# Patient Record
Sex: MALE | Race: WHITE | NOT HISPANIC OR LATINO | Employment: OTHER | URBAN - METROPOLITAN AREA
[De-identification: names, ages, dates, MRNs, and addresses within clinical notes are randomized per-mention and may not be internally consistent; named-entity substitution may affect disease eponyms.]

---

## 2017-01-27 ENCOUNTER — HISTORICAL (OUTPATIENT)
Dept: ADMINISTRATIVE | Facility: HOSPITAL | Age: 67
End: 2017-01-27

## 2018-02-15 ENCOUNTER — HISTORICAL (OUTPATIENT)
Dept: ADMINISTRATIVE | Facility: HOSPITAL | Age: 68
End: 2018-02-15

## 2018-02-15 LAB
ALBUMIN SERPL-MCNC: 3.7 GM/DL (ref 3.4–5)
ALBUMIN/GLOB SERPL: 1.4 {RATIO}
ALP SERPL-CCNC: 85 UNIT/L (ref 50–136)
ALT SERPL-CCNC: 43 UNIT/L (ref 12–78)
AST SERPL-CCNC: 21 UNIT/L (ref 15–37)
BILIRUB SERPL-MCNC: 1.2 MG/DL (ref 0.2–1)
BILIRUBIN DIRECT+TOT PNL SERPL-MCNC: 0.3 MG/DL (ref 0–0.5)
BILIRUBIN DIRECT+TOT PNL SERPL-MCNC: 0.9 MG/DL (ref 0–0.8)
BUN SERPL-MCNC: 19 MG/DL (ref 7–18)
CALCIUM SERPL-MCNC: 8.6 MG/DL (ref 8.5–10.1)
CHLORIDE SERPL-SCNC: 107 MMOL/L (ref 98–107)
CHOLEST SERPL-MCNC: 114 MG/DL (ref 0–200)
CHOLEST/HDLC SERPL: 2.4 {RATIO} (ref 0–5)
CO2 SERPL-SCNC: 25 MMOL/L (ref 21–32)
CREAT SERPL-MCNC: 0.83 MG/DL (ref 0.7–1.3)
GLOBULIN SER-MCNC: 2.6 GM/DL (ref 2.4–3.5)
GLUCOSE SERPL-MCNC: 103 MG/DL (ref 74–106)
HDLC SERPL-MCNC: 47 MG/DL (ref 35–60)
LDLC SERPL CALC-MCNC: 36 MG/DL (ref 0–129)
POTASSIUM SERPL-SCNC: 3.9 MMOL/L (ref 3.5–5.1)
PROT SERPL-MCNC: 6.3 GM/DL (ref 6.4–8.2)
SODIUM SERPL-SCNC: 140 MMOL/L (ref 136–145)
TRIGL SERPL-MCNC: 155 MG/DL (ref 30–150)
VLDLC SERPL CALC-MCNC: 31 MG/DL

## 2018-04-02 ENCOUNTER — HISTORICAL (OUTPATIENT)
Dept: LAB | Facility: HOSPITAL | Age: 68
End: 2018-04-02

## 2018-04-03 ENCOUNTER — HISTORICAL (OUTPATIENT)
Dept: CARDIOLOGY | Facility: HOSPITAL | Age: 68
End: 2018-04-03

## 2018-04-11 ENCOUNTER — HISTORICAL (OUTPATIENT)
Dept: RADIOLOGY | Facility: HOSPITAL | Age: 68
End: 2018-04-11

## 2019-01-18 ENCOUNTER — HISTORICAL (OUTPATIENT)
Dept: CARDIOLOGY | Facility: HOSPITAL | Age: 69
End: 2019-01-18

## 2019-09-25 ENCOUNTER — HISTORICAL (OUTPATIENT)
Dept: ADMINISTRATIVE | Facility: HOSPITAL | Age: 69
End: 2019-09-25

## 2019-09-25 LAB
ALBUMIN SERPL-MCNC: 4 GM/DL (ref 3.4–5)
ALP SERPL-CCNC: 90 UNIT/L (ref 50–136)
ALT SERPL-CCNC: 51 UNIT/L (ref 12–78)
AST SERPL-CCNC: 27 UNIT/L (ref 15–37)
BILIRUB SERPL-MCNC: 1.3 MG/DL (ref 0.2–1)
BILIRUBIN DIRECT+TOT PNL SERPL-MCNC: 0.3 MG/DL (ref 0–0.5)
BILIRUBIN DIRECT+TOT PNL SERPL-MCNC: 1 MG/DL (ref 0–0.8)
CHOLEST SERPL-MCNC: 126 MG/DL (ref 0–200)
CHOLEST/HDLC SERPL: 2.6 {RATIO} (ref 0–5)
HDLC SERPL-MCNC: 49 MG/DL (ref 35–60)
LDLC SERPL CALC-MCNC: 40 MG/DL (ref 0–129)
LIVER PROFILE INTERP: ABNORMAL
PROT SERPL-MCNC: 6.7 GM/DL (ref 6.4–8.2)
TRIGL SERPL-MCNC: 185 MG/DL (ref 30–150)
VLDLC SERPL CALC-MCNC: 37 MG/DL

## 2020-09-09 ENCOUNTER — HISTORICAL (OUTPATIENT)
Dept: ADMINISTRATIVE | Facility: HOSPITAL | Age: 70
End: 2020-09-09

## 2021-01-15 ENCOUNTER — HISTORICAL (OUTPATIENT)
Dept: ADMINISTRATIVE | Facility: HOSPITAL | Age: 71
End: 2021-01-15

## 2021-01-15 LAB
ABS NEUT (OLG): 3.8 X10(3)/MCL (ref 2.1–9.2)
ALBUMIN SERPL-MCNC: 4.2 GM/DL (ref 3.4–4.8)
ALBUMIN/GLOB SERPL: 1.8 RATIO (ref 1.1–2)
ALP SERPL-CCNC: 95 UNIT/L (ref 40–150)
ALT SERPL-CCNC: 34 UNIT/L (ref 0–55)
AST SERPL-CCNC: 26 UNIT/L (ref 5–34)
BASOPHILS # BLD AUTO: 0 X10(3)/MCL (ref 0–0.2)
BASOPHILS NFR BLD AUTO: 1 %
BILIRUB SERPL-MCNC: 1.2 MG/DL
BILIRUBIN DIRECT+TOT PNL SERPL-MCNC: 0.5 MG/DL (ref 0–0.5)
BILIRUBIN DIRECT+TOT PNL SERPL-MCNC: 0.7 MG/DL (ref 0–0.8)
BUN SERPL-MCNC: 13.8 MG/DL (ref 8.4–25.7)
CALCIUM SERPL-MCNC: 8.8 MG/DL (ref 8.8–10)
CHLORIDE SERPL-SCNC: 111 MMOL/L (ref 98–107)
CHOLEST SERPL-MCNC: 128 MG/DL
CHOLEST/HDLC SERPL: 3 {RATIO} (ref 0–5)
CO2 SERPL-SCNC: 24 MMOL/L (ref 23–31)
CREAT SERPL-MCNC: 0.82 MG/DL (ref 0.73–1.18)
EOSINOPHIL # BLD AUTO: 0.2 X10(3)/MCL (ref 0–0.9)
EOSINOPHIL NFR BLD AUTO: 4 %
ERYTHROCYTE [DISTWIDTH] IN BLOOD BY AUTOMATED COUNT: 12.3 % (ref 11.5–17)
GLOBULIN SER-MCNC: 2.4 GM/DL (ref 2.4–3.5)
GLUCOSE SERPL-MCNC: 94 MG/DL (ref 82–115)
HCT VFR BLD AUTO: 43.3 % (ref 42–52)
HDLC SERPL-MCNC: 47 MG/DL (ref 35–60)
HGB BLD-MCNC: 15.1 GM/DL (ref 14–18)
LDLC SERPL CALC-MCNC: 59 MG/DL (ref 50–140)
LYMPHOCYTES # BLD AUTO: 1.8 X10(3)/MCL (ref 0.6–4.6)
LYMPHOCYTES NFR BLD AUTO: 29 %
MCH RBC QN AUTO: 35.6 PG (ref 27–31)
MCHC RBC AUTO-ENTMCNC: 34.9 GM/DL (ref 33–36)
MCV RBC AUTO: 102.1 FL (ref 80–94)
MONOCYTES # BLD AUTO: 0.4 X10(3)/MCL (ref 0.1–1.3)
MONOCYTES NFR BLD AUTO: 6 %
NEUTROPHILS # BLD AUTO: 3.8 X10(3)/MCL (ref 2.1–9.2)
NEUTROPHILS NFR BLD AUTO: 60 %
PLATELET # BLD AUTO: 154 X10(3)/MCL (ref 130–400)
PMV BLD AUTO: 10.6 FL (ref 9.4–12.4)
POTASSIUM SERPL-SCNC: 4.4 MMOL/L (ref 3.5–5.1)
PROT SERPL-MCNC: 6.6 GM/DL (ref 5.8–7.6)
RBC # BLD AUTO: 4.24 X10(6)/MCL (ref 4.7–6.1)
SODIUM SERPL-SCNC: 142 MMOL/L (ref 136–145)
TRIGL SERPL-MCNC: 110 MG/DL (ref 34–140)
TSH SERPL-ACNC: 1.53 UIU/ML (ref 0.35–4.94)
URATE SERPL-MCNC: 7.2 MG/DL (ref 3.5–7.2)
VLDLC SERPL CALC-MCNC: 22 MG/DL
WBC # SPEC AUTO: 6.4 X10(3)/MCL (ref 4.5–11.5)

## 2021-09-06 ENCOUNTER — HISTORICAL (OUTPATIENT)
Dept: ADMINISTRATIVE | Facility: HOSPITAL | Age: 71
End: 2021-09-06

## 2021-09-06 LAB
SARS-COV-2 RNA RESP QL NAA+PROBE: NEGATIVE
SARS-COV-2 RNA RESP QL NAA+PROBE: NOT DETECTED

## 2021-11-22 ENCOUNTER — HISTORICAL (OUTPATIENT)
Dept: ADMINISTRATIVE | Facility: HOSPITAL | Age: 71
End: 2021-11-22

## 2021-11-22 LAB — SARS-COV-2 RNA RESP QL NAA+PROBE: NEGATIVE

## 2022-03-17 ENCOUNTER — HOSPITAL ENCOUNTER (OUTPATIENT)
Dept: MEDSURG UNIT | Facility: HOSPITAL | Age: 72
End: 2022-03-18
Attending: INTERNAL MEDICINE | Admitting: INTERNAL MEDICINE

## 2022-03-18 LAB
ABS NEUT (OLG): 3.53 (ref 2.1–9.2)
ALBUMIN SERPL-MCNC: 3.6 G/DL (ref 3.4–4.8)
ALBUMIN/GLOB SERPL: 1.6 {RATIO} (ref 1.1–2)
ALP SERPL-CCNC: 79 U/L (ref 40–150)
ALT SERPL-CCNC: 26 U/L (ref 0–55)
AST SERPL-CCNC: 24 U/L (ref 5–34)
BASOPHILS # BLD AUTO: 0 10*3/UL (ref 0–0.2)
BASOPHILS NFR BLD AUTO: 0 %
BILIRUB SERPL-MCNC: 1.2 MG/DL
BILIRUBIN DIRECT+TOT PNL SERPL-MCNC: 0.5 (ref 0–0.5)
BILIRUBIN DIRECT+TOT PNL SERPL-MCNC: 0.7 (ref 0–0.8)
BUN SERPL-MCNC: 15.5 MG/DL (ref 8.4–25.7)
CALCIUM SERPL-MCNC: 8.5 MG/DL (ref 8.7–10.5)
CHLORIDE SERPL-SCNC: 109 MMOL/L (ref 98–107)
CO2 SERPL-SCNC: 23 MMOL/L (ref 23–31)
CREAT SERPL-MCNC: 0.87 MG/DL (ref 0.73–1.18)
EOSINOPHIL # BLD AUTO: 0.2 10*3/UL (ref 0–0.9)
EOSINOPHIL NFR BLD AUTO: 3 %
ERYTHROCYTE [DISTWIDTH] IN BLOOD BY AUTOMATED COUNT: 13 % (ref 11.5–17)
GLOBULIN SER-MCNC: 2.3 G/DL (ref 2.4–3.5)
GLUCOSE SERPL-MCNC: 117 MG/DL (ref 82–115)
HCT VFR BLD AUTO: 39.8 % (ref 42–52)
HEMOLYSIS INTERF INDEX SERPL-ACNC: 37
HGB BLD-MCNC: 13.5 G/DL (ref 14–18)
ICTERIC INTERF INDEX SERPL-ACNC: 1
LIPEMIC INTERF INDEX SERPL-ACNC: 3
LYMPHOCYTES # BLD AUTO: 1.4 10*3/UL (ref 0.6–4.6)
LYMPHOCYTES NFR BLD AUTO: 24 %
MANUAL DIFF? (OHS): NO
MCH RBC QN AUTO: 36.8 PG (ref 27–31)
MCHC RBC AUTO-ENTMCNC: 33.9 G/DL (ref 33–36)
MCV RBC AUTO: 108.4 FL (ref 80–94)
MONOCYTES # BLD AUTO: 0.5 10*3/UL (ref 0.1–1.3)
MONOCYTES NFR BLD AUTO: 9 %
NEUTROPHILS # BLD AUTO: 3.53 10*3/UL (ref 2.1–9.2)
NEUTROPHILS NFR BLD AUTO: 62 %
PLATELET # BLD AUTO: 96 10*3/UL (ref 130–400)
PMV BLD AUTO: 10 FL (ref 9.4–12.4)
POS ERR1 (OHS): NORMAL
POTASSIUM SERPL-SCNC: 4.3 MMOL/L (ref 3.5–5.1)
PROT SERPL-MCNC: 5.9 G/DL (ref 5.8–7.6)
RBC # BLD AUTO: 3.67 10*6/UL (ref 4.7–6.1)
SODIUM SERPL-SCNC: 141 MMOL/L (ref 136–145)
WBC # SPEC AUTO: 5.6 10*3/UL (ref 4.5–11.5)

## 2022-04-09 ENCOUNTER — HISTORICAL (OUTPATIENT)
Dept: ADMINISTRATIVE | Facility: HOSPITAL | Age: 72
End: 2022-04-09
Payer: MEDICARE

## 2022-04-27 VITALS
DIASTOLIC BLOOD PRESSURE: 87 MMHG | WEIGHT: 285.06 LBS | HEIGHT: 70 IN | BODY MASS INDEX: 40.81 KG/M2 | SYSTOLIC BLOOD PRESSURE: 145 MMHG | OXYGEN SATURATION: 97 %

## 2022-05-26 ENCOUNTER — OFFICE VISIT (OUTPATIENT)
Dept: NEUROLOGY | Facility: CLINIC | Age: 72
End: 2022-05-26
Payer: MEDICARE

## 2022-05-26 VITALS
DIASTOLIC BLOOD PRESSURE: 78 MMHG | SYSTOLIC BLOOD PRESSURE: 114 MMHG | WEIGHT: 270 LBS | HEIGHT: 70 IN | BODY MASS INDEX: 38.65 KG/M2

## 2022-05-26 DIAGNOSIS — G47.33 OSA ON CPAP: Primary | ICD-10-CM

## 2022-05-26 PROCEDURE — 99999 PR PBB SHADOW E&M-EST. PATIENT-LVL III: CPT | Mod: PBBFAC,,, | Performed by: NURSE PRACTITIONER

## 2022-05-26 PROCEDURE — 99213 OFFICE O/P EST LOW 20 MIN: CPT | Mod: PBBFAC | Performed by: NURSE PRACTITIONER

## 2022-05-26 PROCEDURE — 99213 OFFICE O/P EST LOW 20 MIN: CPT | Mod: S$PBB,,, | Performed by: NURSE PRACTITIONER

## 2022-05-26 PROCEDURE — 99213 PR OFFICE/OUTPT VISIT, EST, LEVL III, 20-29 MIN: ICD-10-PCS | Mod: S$PBB,,, | Performed by: NURSE PRACTITIONER

## 2022-05-26 PROCEDURE — 99999 PR PBB SHADOW E&M-EST. PATIENT-LVL III: ICD-10-PCS | Mod: PBBFAC,,, | Performed by: NURSE PRACTITIONER

## 2022-05-26 RX ORDER — RANOLAZINE 1000 MG/1
1000 TABLET, EXTENDED RELEASE ORAL 2 TIMES DAILY
COMMUNITY
Start: 2022-05-09

## 2022-05-26 RX ORDER — VALSARTAN 80 MG/1
80 TABLET ORAL NIGHTLY
COMMUNITY
Start: 2022-04-24

## 2022-05-26 RX ORDER — ICOSAPENT ETHYL 1000 MG/1
2 CAPSULE ORAL 2 TIMES DAILY
COMMUNITY
Start: 2022-05-05

## 2022-05-26 RX ORDER — ATORVASTATIN CALCIUM 40 MG/1
40 TABLET, FILM COATED ORAL NIGHTLY
COMMUNITY
Start: 2022-01-12

## 2022-05-26 RX ORDER — DILTIAZEM HYDROCHLORIDE 120 MG/1
120 CAPSULE, COATED, EXTENDED RELEASE ORAL DAILY
COMMUNITY
Start: 2022-03-25

## 2022-05-26 RX ORDER — METOPROLOL SUCCINATE 100 MG/1
100 TABLET, EXTENDED RELEASE ORAL EVERY MORNING
COMMUNITY
Start: 2022-04-24

## 2022-05-26 RX ORDER — CLOPIDOGREL BISULFATE 75 MG/1
75 TABLET ORAL DAILY
COMMUNITY
Start: 2022-03-18 | End: 2022-10-31 | Stop reason: ALTCHOICE

## 2022-05-26 RX ORDER — NITROGLYCERIN 0.4 MG/1
1 TABLET SUBLINGUAL EVERY 5 MIN PRN
COMMUNITY
Start: 2022-02-07 | End: 2023-09-18

## 2022-05-26 NOTE — PROGRESS NOTES
Subjective:       Patient ID: Kevin Alva III is a 71 y.o. male.    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    Cardiac stent placed in march    Review of Systems    A 14pt ROS was reviewed & is negative unless otherwise documented in the HPI    Objective:      Physical Exam    GENERAL: NAD, calm, cooperative, appropriate  Awake/alert  Well groomed  RESP: CTAB  HEART: RRR  No LE edema  MENTAL STATUS: oriented, follow commands reliably  SPEECH/LANGUAGE: clear, fluent  CN:  Perrla, eomi, vff, gaze conjugate  No tactile or motor facial asymmetry  Tongue protrudes midline  Motor: no focal weakness  Cerebellar: no tremor or dysmetria  Sensory: normal to tactile stim/vibration  DTRs: normal +2, symmetric  Gait: steady    I, shannan colón np, certify that dr. Doug frazier the supervising/rendering physician is physically present in the office at the time of the visit    Assessment:       Problem List Items Addressed This Visit        Other    MILE on CPAP - Primary (Chronic)          Plan:       Increase cpap to 18  Supplies renewed  Compliant/benefitting/medically necessary  F/u 1y

## 2022-07-20 ENCOUNTER — OFFICE VISIT (OUTPATIENT)
Dept: URGENT CARE | Facility: CLINIC | Age: 72
End: 2022-07-20
Payer: MEDICARE

## 2022-07-20 VITALS
SYSTOLIC BLOOD PRESSURE: 130 MMHG | OXYGEN SATURATION: 97 % | RESPIRATION RATE: 16 BRPM | BODY MASS INDEX: 40.82 KG/M2 | HEART RATE: 65 BPM | HEIGHT: 70 IN | TEMPERATURE: 98 F | DIASTOLIC BLOOD PRESSURE: 83 MMHG | WEIGHT: 285.13 LBS

## 2022-07-20 DIAGNOSIS — Z11.52 ENCOUNTER FOR SCREENING FOR COVID-19: Primary | ICD-10-CM

## 2022-07-20 DIAGNOSIS — J06.9 ACUTE URI: ICD-10-CM

## 2022-07-20 LAB
CTP QC/QA: YES
FLUAV AG UPPER RESP QL IA.RAPID: NOT DETECTED
FLUBV AG UPPER RESP QL IA.RAPID: NOT DETECTED
RSV A 5' UTR RNA NPH QL NAA+PROBE: NOT DETECTED
SARS-COV-2 RDRP RESP QL NAA+PROBE: NEGATIVE
SARS-COV-2 RNA RESP QL NAA+PROBE: NOT DETECTED

## 2022-07-20 PROCEDURE — U0002 COVID-19 LAB TEST NON-CDC: HCPCS | Mod: PBBFAC | Performed by: NURSE PRACTITIONER

## 2022-07-20 PROCEDURE — 99213 PR OFFICE/OUTPT VISIT, EST, LEVL III, 20-29 MIN: ICD-10-PCS | Mod: S$PBB,,, | Performed by: NURSE PRACTITIONER

## 2022-07-20 PROCEDURE — 99215 OFFICE O/P EST HI 40 MIN: CPT | Mod: PBBFAC | Performed by: NURSE PRACTITIONER

## 2022-07-20 PROCEDURE — 87636 SARSCOV2 & INF A&B AMP PRB: CPT | Performed by: NURSE PRACTITIONER

## 2022-07-20 PROCEDURE — 99213 OFFICE O/P EST LOW 20 MIN: CPT | Mod: S$PBB,,, | Performed by: NURSE PRACTITIONER

## 2022-07-20 NOTE — PATIENT INSTRUCTIONS
- OTC cold/flu products as desired for symptoms  - Plenty of fluids  - Home from work/school  - Tylenol or Motrin for pain/fever  - Flu/COVID/RSV tests pending in hospital lab.    - There is a chance your test in the clinic was a FALSE negative due to your symptoms starting today. We will know your final result today and notify you. Please answer your phone when you see [phone] calling you.

## 2022-07-20 NOTE — PROGRESS NOTES
"Subjective:       Patient ID: Kevin Alva III is a 71 y.o. male.    Vitals:  height is 5' 9.69" (1.77 m) and weight is 129.3 kg (285 lb 1.6 oz). His temperature is 97.7 °F (36.5 °C). His blood pressure is 130/83 and his pulse is 65. His respiration is 16 and oxygen saturation is 97%.     Chief Complaint: Headache (HA, sneezing, congestion today. Denies sore throat/swab.)    HPI as stated in CC. No prior hx of COVID. +vaccinated. States mostly congestion/sneezing.  ROS    Objective:      Physical Exam   Constitutional: He is oriented to person, place, and time.  Non-toxic appearance. He appears ill. No distress. obesity  HENT:   Nose: Congestion present.   Eyes: Right conjunctiva is injected. Left conjunctiva is injected.   Neck: Neck supple.   Cardiovascular: Normal rate, regular rhythm and normal heart sounds.   Pulmonary/Chest: Effort normal and breath sounds normal.   Neurological: He is alert and oriented to person, place, and time.   Skin: Skin is warm, dry and not diaphoretic.   Psychiatric: His behavior is normal. Mood, judgment and thought content normal.   Vitals reviewed.        Assessment:       1. Encounter for screening for COVID-19    2. Acute URI        Results for orders placed or performed in visit on 07/20/22   POCT COVID-19 Rapid Screening   Result Value Ref Range    POC Rapid COVID Negative Negative     Acceptable Yes      Plan:         Encounter for screening for COVID-19  -     POCT COVID-19 Rapid Screening  -     COVID/RSV/FLU A&B PCR    Acute URI  -     POCT COVID-19 Rapid Screening  -     COVID/RSV/FLU A&B PCR    Repeat PCR Combo swab due to symptom onset yesterday or today, possible false negative.  Pt has +hx cardiac stents, BMI 41, sleep apnea, age is 71 years old. If his PCR test is COVID+ I will refer him for mAb infusion, he verbally consents to this at this time. He does not qualify for Paxlovid because he takes both Eliquis and Plavix.      - OTC cold/flu products " as desired for symptoms  - Plenty of fluids  - Home from work/school  - Tylenol or Motrin for pain/fever  - Flu/COVID/RSV tests pending in hospital lab.    - There is a chance your test in the clinic was a FALSE negative due to your symptoms starting today. We will know your final result today and notify you. Please answer your phone when you see [phone] calling you.

## 2022-07-20 NOTE — LETTER
July 20, 2022      [facility]  [address] [facility] [address]  Phone: [phone]       Patient: Kevin Alva   YOB: 1950  Date of Visit: 07/20/2022    To Whom It May Concern:    Roxy Alva  was at [facility] on 07/20/2022. The patient may return to work/school when results are received. If you have any questions or concerns, or if I can be of further assistance, please do not hesitate to contact me.    Sincerely,    ADDISON Boudreaux NP

## 2022-08-05 ENCOUNTER — OFFICE VISIT (OUTPATIENT)
Dept: INTERNAL MEDICINE | Facility: CLINIC | Age: 72
End: 2022-08-05
Payer: MEDICARE

## 2022-08-05 VITALS
DIASTOLIC BLOOD PRESSURE: 80 MMHG | RESPIRATION RATE: 16 BRPM | OXYGEN SATURATION: 95 % | WEIGHT: 286 LBS | HEART RATE: 62 BPM | HEIGHT: 70 IN | SYSTOLIC BLOOD PRESSURE: 128 MMHG | BODY MASS INDEX: 40.94 KG/M2

## 2022-08-05 DIAGNOSIS — R19.03 RIGHT LOWER QUADRANT ABDOMINAL MASS: ICD-10-CM

## 2022-08-05 DIAGNOSIS — R53.81 MALAISE AND FATIGUE: ICD-10-CM

## 2022-08-05 DIAGNOSIS — Z23 NEED FOR PNEUMOCOCCAL VACCINE: Primary | ICD-10-CM

## 2022-08-05 DIAGNOSIS — R53.83 MALAISE AND FATIGUE: ICD-10-CM

## 2022-08-05 PROBLEM — M79.606 PAIN OF LOWER EXTREMITY: Status: ACTIVE | Noted: 2022-08-05

## 2022-08-05 PROBLEM — I10 HYPERTENSION: Status: ACTIVE | Noted: 2022-08-05

## 2022-08-05 PROBLEM — E78.5 HYPERLIPIDEMIA: Status: ACTIVE | Noted: 2022-08-05

## 2022-08-05 PROBLEM — I65.29 STENOSIS OF CAROTID ARTERY: Status: ACTIVE | Noted: 2022-08-05

## 2022-08-05 PROBLEM — R19.7 DIARRHEA: Status: ACTIVE | Noted: 2020-11-24

## 2022-08-05 PROBLEM — R42 VERTIGO: Status: ACTIVE | Noted: 2022-08-05

## 2022-08-05 PROBLEM — I25.10 ATHEROSCLEROSIS OF CORONARY ARTERY: Status: ACTIVE | Noted: 2022-08-05

## 2022-08-05 PROBLEM — E66.9 OBESITY: Status: ACTIVE | Noted: 2022-08-05

## 2022-08-05 PROBLEM — Z72.0 TOBACCO USER: Status: ACTIVE | Noted: 2022-08-05

## 2022-08-05 PROBLEM — I34.0 MITRAL VALVE INSUFFICIENCY: Status: ACTIVE | Noted: 2022-08-05

## 2022-08-05 PROBLEM — I20.9 ANGINA PECTORIS: Status: ACTIVE | Noted: 2022-08-05

## 2022-08-05 PROBLEM — M10.00 IDIOPATHIC GOUT: Status: ACTIVE | Noted: 2018-12-17

## 2022-08-05 PROBLEM — I48.91 ATRIAL FIBRILLATION: Status: ACTIVE | Noted: 2022-08-05

## 2022-08-05 PROBLEM — Z20.828 CONTACT WITH AND (SUSPECTED) EXPOSURE TO OTHER VIRAL COMMUNICABLE DISEASES: Status: ACTIVE | Noted: 2020-10-09

## 2022-08-05 PROBLEM — R06.81 APNEA: Status: ACTIVE | Noted: 2022-08-05

## 2022-08-05 PROBLEM — M79.10 MYALGIA: Status: ACTIVE | Noted: 2022-08-05

## 2022-08-05 PROCEDURE — G0009 ADMIN PNEUMOCOCCAL VACCINE: HCPCS | Mod: ,,, | Performed by: NURSE PRACTITIONER

## 2022-08-05 PROCEDURE — 90677 PNEUMOCOCCAL CONJUGATE VACCINE 20-VALENT: ICD-10-PCS | Mod: ,,, | Performed by: NURSE PRACTITIONER

## 2022-08-05 PROCEDURE — 90677 PCV20 VACCINE IM: CPT | Mod: ,,, | Performed by: NURSE PRACTITIONER

## 2022-08-05 PROCEDURE — 99214 OFFICE O/P EST MOD 30 MIN: CPT | Mod: 25,,, | Performed by: NURSE PRACTITIONER

## 2022-08-05 PROCEDURE — G0009 PNEUMOCOCCAL CONJUGATE VACCINE 20-VALENT: ICD-10-PCS | Mod: ,,, | Performed by: NURSE PRACTITIONER

## 2022-08-05 PROCEDURE — 99214 PR OFFICE/OUTPT VISIT, EST, LEVL IV, 30-39 MIN: ICD-10-PCS | Mod: 25,,, | Performed by: NURSE PRACTITIONER

## 2022-08-05 RX ORDER — FUROSEMIDE 40 MG/1
40 TABLET ORAL DAILY PRN
COMMUNITY
Start: 2022-07-20

## 2022-08-05 NOTE — PROGRESS NOTES
"Subjective:      Patient ID: Kevin Alva III is a 71 y.o. male.    Chief Complaint: Mass (Pt has a hard knot on lower right of naval and not painful pt states it is the size of an egg. Pt noticed it yesterday)      HPI: Patient here today for c/o L lateral lump to umbilicus. States area is a hard knot to R latera aspect of umbilicus noted last night.  Denies pain, fever, chills, sweats, excessive weight loss.     Vaccines: PNA today.      Review of patient's allergies indicates:   Allergen Reactions    Promethazine Hives       Review of Systems  Constitutional: No fever, No chills, No sweats, Chronic, general fatigue, No weight loss.  Gastrointestinal: No nausea, No vomiting, No diarrhea, No rectal bleeding, No constipation, No abdominal pain. R lateral umbilical lump.    Objective:   /80 (BP Location: Left arm, Patient Position: Sitting)   Pulse 62   Resp 16   Ht 5' 9.69" (1.77 m)   Wt 129.7 kg (286 lb)   SpO2 95%   BMI 41.40 kg/m²     Physical Exam  Constitutional:       General: He is not in acute distress.     Appearance: Normal appearance. He is normal weight. He is not ill-appearing, toxic-appearing or diaphoretic.   HENT:      Head: Normocephalic and atraumatic.   Eyes:      Pupils: Pupils are equal, round, and reactive to light.   Cardiovascular:      Rate and Rhythm: Normal rate and regular rhythm.      Heart sounds: Normal heart sounds. No murmur heard.    No friction rub. No gallop.   Pulmonary:      Effort: Pulmonary effort is normal. No respiratory distress.      Breath sounds: Normal breath sounds. No stridor. No wheezing, rhonchi or rales.   Chest:      Chest wall: No tenderness.   Abdominal:      General: Abdomen is flat. Bowel sounds are normal. There is no distension.      Palpations: Abdomen is soft. There is mass.      Tenderness: There is no abdominal tenderness. There is no right CVA tenderness, left CVA tenderness, guarding or rebound.      Hernia: A hernia (R lateral " abdominal wall hernia approx 4 cm in diameter, NTTP, reducible) is present.   Musculoskeletal:         General: No swelling, tenderness, deformity or signs of injury. Normal range of motion.      Right lower leg: No edema.      Left lower leg: No edema.   Skin:     General: Skin is warm and dry.      Coloration: Skin is not jaundiced or pale.      Findings: No bruising, erythema, lesion or rash.   Neurological:      General: No focal deficit present.      Mental Status: He is alert and oriented to person, place, and time. Mental status is at baseline.      Cranial Nerves: No cranial nerve deficit.      Sensory: No sensory deficit.      Motor: No weakness.      Coordination: Coordination normal.      Gait: Gait normal.      Deep Tendon Reflexes: Reflexes normal.   Psychiatric:         Mood and Affect: Mood normal.         Thought Content: Thought content normal.         Judgment: Judgment normal.         Assessment/Plan:   1. Right lower quadrant abdominal mass  US with Valsalva. Consider further eval with CT abdomen if warranted.  Reassurance provided.  - US Abdomen Limited_Hernia; Future    2. Malaise and fatigue  Rec gradual inc in CV activity to 150 minutes/week. TLCs.      No follow-ups on file.

## 2022-08-09 DIAGNOSIS — R19.00 INTRA-ABDOMINAL AND PELVIC SWELLING, MASS AND LUMP, UNSPECIFIED SITE: ICD-10-CM

## 2022-08-10 DIAGNOSIS — N28.1 RENAL CYST, LEFT: Primary | ICD-10-CM

## 2022-08-10 DIAGNOSIS — M45.6 ANKYLOSING SPONDYLITIS OF LUMBAR REGION: Primary | ICD-10-CM

## 2022-08-18 ENCOUNTER — LAB VISIT (OUTPATIENT)
Dept: LAB | Facility: HOSPITAL | Age: 72
End: 2022-08-18
Attending: NURSE PRACTITIONER
Payer: MEDICARE

## 2022-08-18 DIAGNOSIS — M45.6 ANKYLOSING SPONDYLITIS OF LUMBAR REGION: ICD-10-CM

## 2022-08-18 LAB
CRP SERPL-MCNC: 1.6 MG/L
ERYTHROCYTE [SEDIMENTATION RATE] IN BLOOD: 12 MM/HR (ref 0–15)

## 2022-08-18 PROCEDURE — 85651 RBC SED RATE NONAUTOMATED: CPT

## 2022-08-18 PROCEDURE — 86431 RHEUMATOID FACTOR QUANT: CPT

## 2022-08-18 PROCEDURE — 36415 COLL VENOUS BLD VENIPUNCTURE: CPT

## 2022-08-18 PROCEDURE — 86140 C-REACTIVE PROTEIN: CPT

## 2022-08-21 ENCOUNTER — OFFICE VISIT (OUTPATIENT)
Dept: URGENT CARE | Facility: CLINIC | Age: 72
End: 2022-08-21
Payer: MEDICARE

## 2022-08-21 VITALS
DIASTOLIC BLOOD PRESSURE: 73 MMHG | SYSTOLIC BLOOD PRESSURE: 120 MMHG | HEART RATE: 80 BPM | TEMPERATURE: 98 F | BODY MASS INDEX: 40.54 KG/M2 | OXYGEN SATURATION: 97 % | WEIGHT: 283.19 LBS | RESPIRATION RATE: 18 BRPM | HEIGHT: 70 IN

## 2022-08-21 DIAGNOSIS — Z11.52 ENCOUNTER FOR SCREENING FOR COVID-19: Primary | ICD-10-CM

## 2022-08-21 DIAGNOSIS — R68.89 FLU-LIKE SYMPTOMS: ICD-10-CM

## 2022-08-21 LAB
CTP QC/QA: YES
CTP QC/QA: YES
FLUAV AG NPH QL: NEGATIVE
FLUAV AG UPPER RESP QL IA.RAPID: NOT DETECTED
FLUBV AG NPH QL: NEGATIVE
FLUBV AG UPPER RESP QL IA.RAPID: NOT DETECTED
HLA TYP COMM-IMP: NORMAL
HLA-B27 QL FC: NEGATIVE
RSV A 5' UTR RNA NPH QL NAA+PROBE: NOT DETECTED
SARS-COV-2 RDRP RESP QL NAA+PROBE: NEGATIVE
SARS-COV-2 RNA RESP QL NAA+PROBE: NOT DETECTED

## 2022-08-21 PROCEDURE — 99214 OFFICE O/P EST MOD 30 MIN: CPT | Mod: PBBFAC | Performed by: NURSE PRACTITIONER

## 2022-08-21 PROCEDURE — 99213 OFFICE O/P EST LOW 20 MIN: CPT | Mod: S$PBB,,, | Performed by: NURSE PRACTITIONER

## 2022-08-21 PROCEDURE — 87804 INFLUENZA ASSAY W/OPTIC: CPT | Mod: PBBFAC | Performed by: NURSE PRACTITIONER

## 2022-08-21 PROCEDURE — 87636 SARSCOV2 & INF A&B AMP PRB: CPT | Mod: CR | Performed by: NURSE PRACTITIONER

## 2022-08-21 PROCEDURE — U0002 COVID-19 LAB TEST NON-CDC: HCPCS | Mod: PBBFAC | Performed by: NURSE PRACTITIONER

## 2022-08-21 PROCEDURE — 99213 PR OFFICE/OUTPT VISIT, EST, LEVL III, 20-29 MIN: ICD-10-PCS | Mod: S$PBB,,, | Performed by: NURSE PRACTITIONER

## 2022-08-21 NOTE — PROGRESS NOTES
"Subjective:       Patient ID: Kevin Alva III is a 71 y.o. male.    Vitals:  height is 5' 9.69" (1.77 m) and weight is 128.5 kg (283 lb 3.2 oz). His oral temperature is 98.3 °F (36.8 °C). His blood pressure is 120/73 and his pulse is 80. His respiration is 18 and oxygen saturation is 97%.     Chief Complaint: Nasal Congestion, Fatigue, and Headache    HPI NO prior history of COVID. Symptoms as above. Confirms takes Eliquis.  ROS    Objective:      Physical Exam   Constitutional: He is oriented to person, place, and time.  Non-toxic appearance. He appears ill. No distress. obesity  HENT:   Nose: Congestion present. No rhinorrhea.   Cardiovascular: Normal rate, regular rhythm and normal heart sounds.      Comments: Distant heart sounds   Pulmonary/Chest: Effort normal. No respiratory distress. He has no wheezes. He has no rhonchi. He has no rales.   Neurological: He is alert and oriented to person, place, and time.   Skin: Skin is dry.   Psychiatric: His affect is angry and inappropriate. He is agitated.   Vitals reviewed.        Assessment:       1. Encounter for screening for COVID-19    2. Flu-like symptoms        Results for orders placed or performed in visit on 08/21/22   POCT COVID-19 Rapid Screening   Result Value Ref Range    POC Rapid COVID Negative Negative     Acceptable Yes    POCT Influenza A/B   Result Value Ref Range    Rapid Influenza A Ag Negative Negative    Rapid Influenza B Ag Negative Negative     Acceptable Yes        Plan:         Encounter for screening for COVID-19  -     POCT COVID-19 Rapid Screening  -     COVID/RSV/FLU A&B PCR    Flu-like symptoms  -     POCT Influenza A/B  -     COVID/RSV/FLU A&B PCR         - OTC cold/flu products as desired for symptoms  - Plenty of fluids  - Home from work/school  - Tylenol or Motrin for pain/fever  - Flu/COVID/RSV PCR tests pending      - You are disqualified for the home treatment called Paxlovid due to your being on " Eliquis. If you are COVID+ on PCR, you qualifiy for Monoclonal Antibody Infusion. You indicated during this visit that you would accept that treatment. We will notify you if your test is + and send your referral ASAP.

## 2022-08-21 NOTE — PATIENT INSTRUCTIONS
- OTC cold/flu products as desired for symptoms  - Plenty of fluids  - Home from work/school  - Tylenol or Motrin for pain/fever  - Flu/COVID/RSV PCR tests pending      - You are disqualified for the home treatment called Paxlovid due to your being on Eliquis. If you are COVID+ on PCR, you qualifiy for Monoclonal Antibody Infusion. You indicated during this visit that you would accept that treatment. We will notify you if your test is + and send your referral ASAP.

## 2022-08-22 ENCOUNTER — PATIENT MESSAGE (OUTPATIENT)
Dept: INTERNAL MEDICINE | Facility: CLINIC | Age: 72
End: 2022-08-22
Payer: MEDICARE

## 2022-08-22 LAB — RHEUMATOID FACTOR IGM (OLG): NEGATIVE

## 2022-08-25 ENCOUNTER — OFFICE VISIT (OUTPATIENT)
Dept: INTERNAL MEDICINE | Facility: CLINIC | Age: 72
End: 2022-08-25
Payer: MEDICARE

## 2022-08-25 DIAGNOSIS — J32.9 SINUSITIS, UNSPECIFIED CHRONICITY, UNSPECIFIED LOCATION: Primary | ICD-10-CM

## 2022-08-25 DIAGNOSIS — I10 PRIMARY HYPERTENSION: ICD-10-CM

## 2022-08-25 DIAGNOSIS — E78.2 MIXED HYPERLIPIDEMIA: ICD-10-CM

## 2022-08-25 DIAGNOSIS — Z12.5 SCREENING FOR PROSTATE CANCER: ICD-10-CM

## 2022-08-25 DIAGNOSIS — Z00.00 ENCOUNTER FOR WELLNESS EXAMINATION IN ADULT: ICD-10-CM

## 2022-08-25 PROCEDURE — 99214 PR OFFICE/OUTPT VISIT, EST, LEVL IV, 30-39 MIN: ICD-10-PCS | Mod: 95,,, | Performed by: NURSE PRACTITIONER

## 2022-08-25 PROCEDURE — 99214 OFFICE O/P EST MOD 30 MIN: CPT | Mod: 95,,, | Performed by: NURSE PRACTITIONER

## 2022-08-25 RX ORDER — CEFDINIR 300 MG/1
300 CAPSULE ORAL 2 TIMES DAILY
Qty: 20 CAPSULE | Refills: 0 | Status: SHIPPED | OUTPATIENT
Start: 2022-08-25 | End: 2022-09-04

## 2022-08-25 NOTE — PROGRESS NOTES
Patient ID: [id number]     Chief Complaint: Fatigue, Facial Pain, and Nasal Congestion (Pt has been congested in nose, fatigued and has facial pain and pressure, pt denies cough, no fever, pt tested neg for covid 3 days started 7 days ago. Pt also denies sore throat.)      HPI:     This is a telemedicine note. Patient was treated using telemedicine, real time audio and video, according to [facility] protocols. I, Melissa VALDEZ, conducted the visit from the [facility]. The patient participated in the visit at a non-[facility] location selected by the patient, identified below. I am licensed in the state where the patient stated they are located. The patient stated that they understood and accepted the privacy and security risks to their information at their location. This visit is not recorded.    Patient was located at the patient's home.       Kevin Alva III is a 71 y.o. male here today for a telemedicine visit for c/o sinus congestion, sinus/facial pressure, yellow nasal d/c and fatigue. No fever, chills, or sweats. He did take COVID test at home and at [facility], both of which negative. Flu neg.         ----------------------------  Hyperlipidemia  Hypertension  MILE on CPAP     Past Surgical History:   Procedure Laterality Date    CORONARY ANGIOPLASTY WITH STENT PLACEMENT  03/2022       Review of patient's allergies indicates:   Allergen Reactions    Promethazine Hives       Outpatient Medications Marked as Taking for the 8/25/22 encounter (Office Visit) with Melissa Santiago NP   Medication Sig Dispense Refill    allopurinoL (ZYLOPRIM) 100 MG tablet TAKE 1 TABLET BY MOUTH EVERY DAY 90 tablet 3    apixaban (ELIQUIS) 5 mg Tab Take 5 mg by mouth 2 (two) times a day.      atorvastatin (LIPITOR) 40 MG tablet Take 40 mg by mouth nightly.      clopidogreL (PLAVIX) 75 mg tablet Take 75 mg by mouth once daily.      diltiaZEM (CARDIZEM CD) 120 MG Cp24 Take 120 mg by mouth once daily.      furosemide  (LASIX) 40 MG tablet Take 40 mg by mouth daily as needed.      metoprolol succinate (TOPROL-XL) 100 MG 24 hr tablet Take 100 mg by mouth every morning.      nitroGLYCERIN (NITROSTAT) 0.4 MG SL tablet Place 1 tablet under the tongue every 5 (five) minutes as needed for Chest pain.      ranolazine (RANEXA) 1,000 mg Tb12 Take 1,000 mg by mouth 2 (two) times daily.      valsartan (DIOVAN) 80 MG tablet Take 80 mg by mouth nightly.      VASCEPA 1 gram Cap Take 2 capsules by mouth 2 (two) times daily.         Social History     Socioeconomic History    Marital status:    Tobacco Use    Smoking status: Former Smoker    Smokeless tobacco: Current User     Types: Chew   Substance and Sexual Activity    Alcohol use: Yes    Drug use: Never        Family History   Problem Relation Age of Onset    Cancer Mother         Lung    Cancer Father         Lung    Heart disease Father     Cancer Sister         Pancreatic        Patient Care Team:  Bucky Whittington MD as PCP - General (Internal Medicine)  MD Linda Paredes AGACNP-BC as Nurse Practitioner (Neurology)      Subjective:       ROS    See HPI for details    Constitutional: Denies Change in appetite. Denies Chills. Denies Fever. Denies Night sweats.  Eye:   ENT: Denies Decreased hearing. Denies Sore throat. Denies Swollen glands. +sinus congestion  Respiratory: Denies Cough. Denies Shortness of breath. Denies Shortness of breath with exertion. Denies Wheezing.    All Other ROS: Negative except as stated in HPI.       Objective:     There were no vitals taken for this visit.    Physical Exam    Physical Exam: LIMITED DUE TO TELEMEDICINE RESTRICTIONS.  General: Alert and oriented, No acute distress.  Head: Normocephalic, Atraumatic.  Neck/Thyroid:  Full range of motion.  Respiratory: Non-labored respirations, Symmetrical chest wall expansion.  Psychiatric: Normal interaction, Coherent speech, Euthymic mood, Appropriate affect       Assessment:        ICD-10-CM ICD-9-CM   1. Sinusitis, unspecified chronicity, unspecified location  J32.9 473.9        Plan:     1. Sinusitis, unspecified chronicity, unspecified location  - cefdinir (OMNICEF) 300 MG capsule; Take 1 capsule (300 mg total) by mouth 2 (two) times daily. for 10 days  Dispense: 20 capsule; Refill: 0    Allegra/Claritin in AM, Benadryl or Xyzal in PM, Flonase nasal spray, Mucinex DM as needed for cough.  F/u worsening s/s of infection.    Medication List with Changes/Refills   New Medications    CEFDINIR (OMNICEF) 300 MG CAPSULE    Take 1 capsule (300 mg total) by mouth 2 (two) times daily. for 10 days       Start Date: 8/25/2022 End Date: 9/4/2022   Current Medications    ALLOPURINOL (ZYLOPRIM) 100 MG TABLET    TAKE 1 TABLET BY MOUTH EVERY DAY       Start Date: 5/17/2022 End Date: --    APIXABAN (ELIQUIS) 5 MG TAB    Take 5 mg by mouth 2 (two) times a day.       Start Date: 10/26/2021End Date: --    ATORVASTATIN (LIPITOR) 40 MG TABLET    Take 40 mg by mouth nightly.       Start Date: 1/12/2022 End Date: --    CLOPIDOGREL (PLAVIX) 75 MG TABLET    Take 75 mg by mouth once daily.       Start Date: 3/18/2022 End Date: --    DILTIAZEM (CARDIZEM CD) 120 MG CP24    Take 120 mg by mouth once daily.       Start Date: 3/25/2022 End Date: --    FUROSEMIDE (LASIX) 40 MG TABLET    Take 40 mg by mouth daily as needed.       Start Date: 7/20/2022 End Date: --    METOPROLOL SUCCINATE (TOPROL-XL) 100 MG 24 HR TABLET    Take 100 mg by mouth every morning.       Start Date: 4/24/2022 End Date: --    NITROGLYCERIN (NITROSTAT) 0.4 MG SL TABLET    Place 1 tablet under the tongue every 5 (five) minutes as needed for Chest pain.       Start Date: 2/7/2022  End Date: --    RANOLAZINE (RANEXA) 1,000 MG TB12    Take 1,000 mg by mouth 2 (two) times daily.       Start Date: 5/9/2022  End Date: --    VALSARTAN (DIOVAN) 80 MG TABLET    Take 80 mg by mouth nightly.       Start Date: 4/24/2022 End Date: --    VASCEPA 1 GRAM CAP    Take  2 capsules by mouth 2 (two) times daily.       Start Date: 5/5/2022  End Date: --          No follow-ups on file. In addition to their scheduled follow up, the patient has also been instructed to follow up on as needed basis.       Video Time Documentation:  Spent 10 minutes with patient face to face discussed health concerns. More than 50% of this time was spent in counseling and coordination of care.

## 2022-10-13 ENCOUNTER — TELEPHONE (OUTPATIENT)
Dept: INTERNAL MEDICINE | Facility: CLINIC | Age: 72
End: 2022-10-13
Payer: MEDICARE

## 2022-10-13 NOTE — TELEPHONE ENCOUNTER
"Patient called stating "I have started exercising again, walking mostly.  Since I have started again my knee problems are beginning to start up again.  I do not want to have to stop exercising but it gets worse every time I walk.  Should  I see a specialist for this?    Do you want pt to see Ortho or PT ?  Please Advise   "

## 2022-10-14 DIAGNOSIS — M25.569 KNEE PAIN, UNSPECIFIED CHRONICITY, UNSPECIFIED LATERALITY: Primary | ICD-10-CM

## 2022-10-24 DIAGNOSIS — R53.83 MALAISE AND FATIGUE: ICD-10-CM

## 2022-10-24 DIAGNOSIS — E78.2 MIXED HYPERLIPIDEMIA: ICD-10-CM

## 2022-10-24 DIAGNOSIS — E55.9 VITAMIN D DEFICIENCY: ICD-10-CM

## 2022-10-24 DIAGNOSIS — Z12.5 SCREENING FOR PROSTATE CANCER: ICD-10-CM

## 2022-10-24 DIAGNOSIS — I10 PRIMARY HYPERTENSION: Primary | ICD-10-CM

## 2022-10-24 DIAGNOSIS — R53.81 MALAISE AND FATIGUE: ICD-10-CM

## 2022-10-24 DIAGNOSIS — Z00.00 ENCOUNTER FOR WELLNESS EXAMINATION IN ADULT: ICD-10-CM

## 2022-10-24 DIAGNOSIS — R73.01 ELEVATED FASTING GLUCOSE: ICD-10-CM

## 2022-10-25 ENCOUNTER — TELEPHONE (OUTPATIENT)
Dept: INTERNAL MEDICINE | Facility: CLINIC | Age: 72
End: 2022-10-25
Payer: MEDICARE

## 2022-10-25 NOTE — TELEPHONE ENCOUNTER
----- Message from Tessa Ryan LPN sent at 10/24/2022  4:51 PM CDT -----  Regarding: pv kylah 10/31 1300  Are there any outstanding tasks in chart? No, but needs FASTING labs PRIOR to appt    Is there any documentation of tasks? no    Has the pt seen another physician, been to ER, [facility], or admitted to hospital since last visit?    Has the pt done blood work or imaging since last visit?

## 2022-10-27 ENCOUNTER — LAB VISIT (OUTPATIENT)
Dept: LAB | Facility: HOSPITAL | Age: 72
End: 2022-10-27
Attending: INTERNAL MEDICINE
Payer: MEDICARE

## 2022-10-27 DIAGNOSIS — R53.81 MALAISE AND FATIGUE: ICD-10-CM

## 2022-10-27 DIAGNOSIS — R73.01 ELEVATED FASTING GLUCOSE: ICD-10-CM

## 2022-10-27 DIAGNOSIS — Z12.5 SCREENING FOR PROSTATE CANCER: ICD-10-CM

## 2022-10-27 DIAGNOSIS — R53.83 MALAISE AND FATIGUE: ICD-10-CM

## 2022-10-27 DIAGNOSIS — Z00.00 ENCOUNTER FOR WELLNESS EXAMINATION IN ADULT: ICD-10-CM

## 2022-10-27 DIAGNOSIS — E55.9 VITAMIN D DEFICIENCY: ICD-10-CM

## 2022-10-27 DIAGNOSIS — I10 PRIMARY HYPERTENSION: ICD-10-CM

## 2022-10-27 DIAGNOSIS — E78.2 MIXED HYPERLIPIDEMIA: ICD-10-CM

## 2022-10-27 LAB
ALBUMIN SERPL-MCNC: 4.2 GM/DL (ref 3.4–4.8)
ALBUMIN/GLOB SERPL: 1.8 RATIO (ref 1.1–2)
ALP SERPL-CCNC: 101 UNIT/L (ref 40–150)
ALT SERPL-CCNC: 41 UNIT/L (ref 0–55)
ANISOCYTOSIS BLD QL SMEAR: ABNORMAL
AST SERPL-CCNC: 27 UNIT/L (ref 5–34)
BASOPHILS # BLD AUTO: 0.02 X10(3)/MCL (ref 0–0.2)
BASOPHILS NFR BLD AUTO: 0.3 %
BILIRUBIN DIRECT+TOT PNL SERPL-MCNC: 1.6 MG/DL
BUN SERPL-MCNC: 20.2 MG/DL (ref 8.4–25.7)
BURR CELLS (OLG): ABNORMAL
CALCIUM SERPL-MCNC: 10.3 MG/DL (ref 8.8–10)
CHLORIDE SERPL-SCNC: 107 MMOL/L (ref 98–107)
CHOLEST SERPL-MCNC: 106 MG/DL
CHOLEST/HDLC SERPL: 3 {RATIO} (ref 0–5)
CO2 SERPL-SCNC: 25 MMOL/L (ref 23–31)
CREAT SERPL-MCNC: 0.9 MG/DL (ref 0.73–1.18)
DEPRECATED CALCIDIOL+CALCIFEROL SERPL-MC: 62.1 NG/ML (ref 30–80)
EOSINOPHIL # BLD AUTO: 0.21 X10(3)/MCL (ref 0–0.9)
EOSINOPHIL NFR BLD AUTO: 3.3 %
ERYTHROCYTE [DISTWIDTH] IN BLOOD BY AUTOMATED COUNT: 12.5 % (ref 11.5–17)
EST. AVERAGE GLUCOSE BLD GHB EST-MCNC: 96.8 MG/DL
GFR SERPLBLD CREATININE-BSD FMLA CKD-EPI: >60 MLS/MIN/1.73/M2
GLOBULIN SER-MCNC: 2.4 GM/DL (ref 2.4–3.5)
GLUCOSE SERPL-MCNC: 96 MG/DL (ref 82–115)
HBA1C MFR BLD: 5 %
HCT VFR BLD AUTO: 45.4 % (ref 42–52)
HDLC SERPL-MCNC: 35 MG/DL (ref 35–60)
HGB BLD-MCNC: 15.3 GM/DL (ref 14–18)
IMM GRANULOCYTES # BLD AUTO: 0.01 X10(3)/MCL (ref 0–0.04)
IMM GRANULOCYTES NFR BLD AUTO: 0.2 %
LDLC SERPL CALC-MCNC: 51 MG/DL (ref 50–140)
LYMPHOCYTES # BLD AUTO: 1.27 X10(3)/MCL (ref 0.6–4.6)
LYMPHOCYTES NFR BLD AUTO: 20.2 %
MACROCYTES BLD QL SMEAR: ABNORMAL
MCH RBC QN AUTO: 35.9 PG (ref 27–31)
MCHC RBC AUTO-ENTMCNC: 33.7 MG/DL (ref 33–36)
MCV RBC AUTO: 106.6 FL (ref 80–94)
MONOCYTES # BLD AUTO: 0.41 X10(3)/MCL (ref 0.1–1.3)
MONOCYTES NFR BLD AUTO: 6.5 %
NEUTROPHILS # BLD AUTO: 4.4 X10(3)/MCL (ref 2.1–9.2)
NEUTROPHILS NFR BLD AUTO: 69.5 %
NRBC BLD AUTO-RTO: 0 %
OVALOCYTES (OLG): ABNORMAL
PLATELET # BLD AUTO: 143 X10(3)/MCL (ref 130–400)
PLATELET # BLD EST: NORMAL 10*3/UL
PMV BLD AUTO: 10.5 FL (ref 7.4–10.4)
POIKILOCYTOSIS BLD QL SMEAR: ABNORMAL
POTASSIUM SERPL-SCNC: 4.2 MMOL/L (ref 3.5–5.1)
PROT SERPL-MCNC: 6.6 GM/DL (ref 5.8–7.6)
PSA SERPL-MCNC: 3.81 NG/ML
RBC # BLD AUTO: 4.26 X10(6)/MCL (ref 4.7–6.1)
RBC MORPH BLD: ABNORMAL
SODIUM SERPL-SCNC: 142 MMOL/L (ref 136–145)
TRIGL SERPL-MCNC: 102 MG/DL (ref 34–140)
TSH SERPL-ACNC: 2.64 UIU/ML (ref 0.35–4.94)
VLDLC SERPL CALC-MCNC: 20 MG/DL
WBC # SPEC AUTO: 6.3 X10(3)/MCL (ref 4.5–11.5)

## 2022-10-27 PROCEDURE — 84153 ASSAY OF PSA TOTAL: CPT

## 2022-10-27 PROCEDURE — 80061 LIPID PANEL: CPT

## 2022-10-27 PROCEDURE — 83036 HEMOGLOBIN GLYCOSYLATED A1C: CPT

## 2022-10-27 PROCEDURE — 84443 ASSAY THYROID STIM HORMONE: CPT

## 2022-10-27 PROCEDURE — 82306 VITAMIN D 25 HYDROXY: CPT

## 2022-10-27 PROCEDURE — 85025 COMPLETE CBC W/AUTO DIFF WBC: CPT

## 2022-10-27 PROCEDURE — 80053 COMPREHEN METABOLIC PANEL: CPT

## 2022-10-27 PROCEDURE — 36415 COLL VENOUS BLD VENIPUNCTURE: CPT

## 2022-10-31 ENCOUNTER — OFFICE VISIT (OUTPATIENT)
Dept: INTERNAL MEDICINE | Facility: CLINIC | Age: 72
End: 2022-10-31
Payer: MEDICARE

## 2022-10-31 VITALS
RESPIRATION RATE: 18 BRPM | BODY MASS INDEX: 36.94 KG/M2 | WEIGHT: 258 LBS | HEIGHT: 70 IN | OXYGEN SATURATION: 97 % | HEART RATE: 64 BPM | DIASTOLIC BLOOD PRESSURE: 84 MMHG | SYSTOLIC BLOOD PRESSURE: 136 MMHG

## 2022-10-31 DIAGNOSIS — I10 PRIMARY HYPERTENSION: ICD-10-CM

## 2022-10-31 DIAGNOSIS — E78.5 HYPERLIPIDEMIA, UNSPECIFIED HYPERLIPIDEMIA TYPE: Primary | ICD-10-CM

## 2022-10-31 DIAGNOSIS — Z00.00 WELLNESS EXAMINATION: ICD-10-CM

## 2022-10-31 DIAGNOSIS — G47.33 OBSTRUCTIVE SLEEP APNEA ON CPAP: ICD-10-CM

## 2022-10-31 PROCEDURE — G0439 PR MEDICARE ANNUAL WELLNESS SUBSEQUENT VISIT: ICD-10-PCS | Mod: ,,, | Performed by: INTERNAL MEDICINE

## 2022-10-31 PROCEDURE — G0439 PPPS, SUBSEQ VISIT: HCPCS | Mod: ,,, | Performed by: INTERNAL MEDICINE

## 2022-10-31 NOTE — PROGRESS NOTES
Patient ID: Kevin Alva III is a 72 y.o. male.    Chief Complaint: Annual Exam (Discuss labs 10/27)      Patient Care Team:  Bucky Whittington MD as PCP - General (Internal Medicine)  RALPH Guerrier as Nurse Practitioner (Neurology)     HPI:   Patient presents here today for above reason.         The patient's Health Maintenance was reviewed and the following appears to be due at this time:   Health Maintenance Due   Topic Date Due    Hepatitis C Screening  Never done    TETANUS VACCINE  Never done    Shingles Vaccine (1 of 2) Never done    COVID-19 Vaccine (4 - Booster for Pfizer series) 02/14/2022    Influenza Vaccine (1) 09/01/2022        Past Medical History:  Past Medical History:   Diagnosis Date    Hyperlipidemia     Hypertension     MILE on CPAP      Past Surgical History:   Procedure Laterality Date    APPENDECTOMY  1955    CORONARY ANGIOPLASTY WITH STENT PLACEMENT  03/2022    CORONARY ARTERY BYPASS GRAFT  2000    TONSILLECTOMY  1956     Review of patient's allergies indicates:   Allergen Reactions    Promethazine Hives     Current Outpatient Medications on File Prior to Visit   Medication Sig Dispense Refill    allopurinoL (ZYLOPRIM) 100 MG tablet TAKE 1 TABLET BY MOUTH EVERY DAY 90 tablet 3    apixaban (ELIQUIS) 5 mg Tab Take 5 mg by mouth 2 (two) times a day.      atorvastatin (LIPITOR) 40 MG tablet Take 40 mg by mouth nightly.      diltiaZEM (CARDIZEM CD) 120 MG Cp24 Take 120 mg by mouth once daily.      furosemide (LASIX) 40 MG tablet Take 40 mg by mouth daily as needed.      metoprolol succinate (TOPROL-XL) 100 MG 24 hr tablet Take 100 mg by mouth every morning.      nitroGLYCERIN (NITROSTAT) 0.4 MG SL tablet Place 1 tablet under the tongue every 5 (five) minutes as needed for Chest pain.      ranolazine (RANEXA) 1,000 mg Tb12 Take 1,000 mg by mouth 2 (two) times daily.      valsartan (DIOVAN) 80 MG tablet Take 80 mg by mouth nightly.      VASCEPA 1 gram Cap Take 2 capsules by mouth 2 (two)  "times daily.      clopidogreL (PLAVIX) 75 mg tablet Take 75 mg by mouth once daily.       No current facility-administered medications on file prior to visit.     Social History     Socioeconomic History    Marital status:    Tobacco Use    Smoking status: Former    Smokeless tobacco: Current     Types: Chew   Substance and Sexual Activity    Alcohol use: Yes     Alcohol/week: 1.0 standard drink     Types: 1 Drinks containing 0.5 oz of alcohol per week     Comment: Every now and then    Drug use: Never    Sexual activity: Not Currently     Partners: Female     Birth control/protection: Abstinence     Family History   Problem Relation Age of Onset    Cancer Mother         Lung    Cancer Father         Lung    Heart disease Father     Cancer Sister         Pancreatic       ROS:   Review of Systems  Constitutional: No weight gain, No fever, No chills, No fatigue.   Eyes: No blurring, No visual disturbances.   Ear/Nose/Mouth/Throat: No decreased hearing, No ear pain, No nasal congestion, No sore throat.   Respiratory: No shortness of breath, No cough, No wheezing.   Cardiovascular: No chest pain, No palpitations, No peripheral edema.   Gastrointestinal: No nausea, No vomiting, No diarrhea, No constipation, No abdominal pain.   Genitourinary: No dysuria, No hematuria.   Hematology/Lymphatics: No bruising tendency, No bleeding tendency, No swollen lymph glands.   Endocrine: No excessive thirst, No polyuria, No excessive hunger.   Musculoskeletal: No joint pain, No muscle pain, No decreased range of motion.   Integumentary: No rash, No pruritus.   Neurologic: No abnormal balance, No confusion, No headache.   Psychiatric: No anxiety, No depression, Not suicidal, No hallucinations.        Patient Reported Health Risk Assessment         Vitals/PE:   /84 (BP Location: Left arm, Patient Position: Sitting)   Pulse 64   Resp 18   Ht 5' 9.69" (1.77 m)   Wt 117 kg (258 lb)   SpO2 97%   BMI 37.35 kg/m²   Physical " Exam    General: Alert and oriented, No acute distress.   Eye: Normal conjunctiva without exudate.  HENMT: Normocephalic/AT, Normal hearing, Oral mucosa is moist and pink   Neck: No goiter visualized.   Respiratory: Lungs CTAB, Respirations are non-labored, Breath sounds are equal, Symmetrical chest wall expansion.  Cardiovascular: Normal rate, Regular rhythm, No murmur, No edema.   Gastrointestinal: Non-distended.   Genitourinary: Deferred.  Musculoskeletal: Normal ROM, Normal gait, No deformities or amputations.  Integumentary: Warm, Dry, Intact. No diaphoresis, or flushing.  Neurologic: No focal deficits, Cranial Nerves II-XII are grossly intact.   Psychiatric: Cooperative, Appropriate mood & affect, Normal judgment, Non-suicidal.    No flowsheet data found.  Fall Risk Assessment - Outpatient 10/31/2022 8/25/2022 8/5/2022 7/20/2022 5/26/2022   Mobility Status Ambulatory Ambulatory Ambulatory Ambulatory Ambulatory   Number of falls 0 0 0 0 0   Identified as fall risk 0 0 0 0 0   Wrist band applied - - - - 1           Depression Screening  Over the past two weeks, has the patient felt down, depressed, or hopeless?: No  Over the past two weeks, has the patient felt little interest or pleasure in doing things?: No  Functional Ability/Safety Screening  Was the patient's timed Up & Go test unsteady or longer than 30 seconds?: No  Does the patient need help with phone, transportation, shopping, preparing meals, housework, laundry, meds, or managing money?: No  Does the patient's home have rugs in the hallway, lack grab bars in the bathroom, lack handrails on the stairs or have poor lighting?: No  Have you noticed any hearing difficulties?: No  Cognitive Function (Assessed through direct observation with due consideration of information obtained by way of patient reports and/or concerns raised by family, friends, caretakers, or others)    Does the patient repeat questions/statements in the same day?: No  Does the patient  have trouble remembering the date, year, and time?: No  Does the patient have difficulty managing finances?: No  Does the patient have a decreased sense of direction?: No    Assessment/Plan:   All labs reviewed and discussed with patient.  All OK.  RTC 6 months.     Wellness  Hypertension  Hyperlipidemia  MILE on CPAP     Recommendations:  Diet (healthy food choices, reduce portions and overall calorie intake)  Exercise 30-45 minutes at least 3x per week  Avoid excessive alcohol intake and tobacco use  Stay UTD with immunizations and preventative screenings   Yearly Labs     ..      ..No orders of the defined types were placed in this encounter.        I am having Kevin Alva III maintain his allopurinoL, apixaban, atorvastatin, clopidogreL, diltiaZEM, VASCEPA, metoprolol succinate, ranolazine, nitroGLYCERIN, valsartan, and furosemide.    No orders of the defined types were placed in this encounter.      Education and counseling done face to face regarding medical conditions and plan. Contact office if new symptoms develop. Should any symptoms ever significantly worsen seek emergency medical attention/go to ER. Follow up at least yearly for wellness or sooner PRN. Nurse to call patient with any results. The patient is receptive, expresses understanding and is agreeable to plan. All questions have been answered.          Advance Care Planning   I attest to discussing Advance Care Planning with patient and/or family member.  Education was provided including the importance of the Health Care Power of , Advance Directives, and/or [place] documentation.  The patient expressed understanding to the importance of this information and discussion.           No follow-ups on file.

## 2022-12-08 ENCOUNTER — TELEPHONE (OUTPATIENT)
Dept: INTERNAL MEDICINE | Facility: CLINIC | Age: 72
End: 2022-12-08
Payer: MEDICARE

## 2022-12-08 NOTE — TELEPHONE ENCOUNTER
He reports he is feeling well. No back pain or further issues. Ok to cx OV with Rheum. F/u as needed.

## 2022-12-08 NOTE — TELEPHONE ENCOUNTER
"Patient called requesting phone call from NP, stating "I have an OV with a Rheumatologist, I do not believe it is needed.  I need to speak to Melissa directly before I cancel the OV for her opinion, she will know why".    #[phone]  "

## 2023-04-01 ENCOUNTER — PATIENT MESSAGE (OUTPATIENT)
Dept: RESEARCH | Facility: HOSPITAL | Age: 73
End: 2023-04-01
Payer: MEDICARE

## 2023-04-21 ENCOUNTER — TELEPHONE (OUTPATIENT)
Dept: INTERNAL MEDICINE | Facility: CLINIC | Age: 73
End: 2023-04-21
Payer: MEDICARE

## 2023-04-21 DIAGNOSIS — E78.5 HYPERLIPIDEMIA, UNSPECIFIED HYPERLIPIDEMIA TYPE: Primary | ICD-10-CM

## 2023-04-21 DIAGNOSIS — R53.81 MALAISE AND FATIGUE: ICD-10-CM

## 2023-04-21 DIAGNOSIS — M10.9 GOUT, UNSPECIFIED CAUSE, UNSPECIFIED CHRONICITY, UNSPECIFIED SITE: ICD-10-CM

## 2023-04-21 DIAGNOSIS — I10 PRIMARY HYPERTENSION: ICD-10-CM

## 2023-04-21 DIAGNOSIS — E55.9 VITAMIN D DEFICIENCY: ICD-10-CM

## 2023-04-21 DIAGNOSIS — R53.83 MALAISE AND FATIGUE: ICD-10-CM

## 2023-04-21 NOTE — TELEPHONE ENCOUNTER
----- Message from Tessa Ryan LPN sent at 4/21/2023  8:13 AM CDT -----  Regarding: pv kylah 4/28 1020  Are there any outstanding tasks in chart? No, but needs FASTING labs PRIOR to appt    Is there any documentation of tasks? no    Has the pt seen another physician, been to ER, [facility], or admitted to hospital since last visit?    Has the pt done blood work or imaging since last visit?    5. PLEASE HAVE PATIENT BRING MEDICATION LIST OR BOTTLES TO EVERY OFFICE VISIT

## 2023-04-26 ENCOUNTER — LAB VISIT (OUTPATIENT)
Dept: LAB | Facility: HOSPITAL | Age: 73
End: 2023-04-26
Attending: FAMILY MEDICINE
Payer: MEDICARE

## 2023-04-26 DIAGNOSIS — R53.83 MALAISE AND FATIGUE: ICD-10-CM

## 2023-04-26 DIAGNOSIS — I10 PRIMARY HYPERTENSION: ICD-10-CM

## 2023-04-26 DIAGNOSIS — M10.9 GOUT, UNSPECIFIED CAUSE, UNSPECIFIED CHRONICITY, UNSPECIFIED SITE: ICD-10-CM

## 2023-04-26 DIAGNOSIS — E78.5 HYPERLIPIDEMIA, UNSPECIFIED HYPERLIPIDEMIA TYPE: ICD-10-CM

## 2023-04-26 DIAGNOSIS — E55.9 VITAMIN D DEFICIENCY: ICD-10-CM

## 2023-04-26 DIAGNOSIS — R53.81 MALAISE AND FATIGUE: ICD-10-CM

## 2023-04-26 LAB
ALBUMIN SERPL-MCNC: 4.1 G/DL (ref 3.4–4.8)
ALBUMIN/GLOB SERPL: 1.7 RATIO (ref 1.1–2)
ALP SERPL-CCNC: 88 UNIT/L (ref 40–150)
ALT SERPL-CCNC: 32 UNIT/L (ref 0–55)
AST SERPL-CCNC: 29 UNIT/L (ref 5–34)
BASOPHILS # BLD AUTO: 0.04 X10(3)/MCL (ref 0–0.2)
BASOPHILS NFR BLD AUTO: 0.7 %
BILIRUBIN DIRECT+TOT PNL SERPL-MCNC: 1.3 MG/DL
BUN SERPL-MCNC: 19.6 MG/DL (ref 8.4–25.7)
CALCIUM SERPL-MCNC: 9.3 MG/DL (ref 8.8–10)
CHLORIDE SERPL-SCNC: 107 MMOL/L (ref 98–107)
CHOLEST SERPL-MCNC: 105 MG/DL
CHOLEST/HDLC SERPL: 3 {RATIO} (ref 0–5)
CO2 SERPL-SCNC: 25 MMOL/L (ref 23–31)
CREAT SERPL-MCNC: 0.85 MG/DL (ref 0.73–1.18)
DEPRECATED CALCIDIOL+CALCIFEROL SERPL-MC: 80.1 NG/ML (ref 30–80)
EOSINOPHIL # BLD AUTO: 0.24 X10(3)/MCL (ref 0–0.9)
EOSINOPHIL NFR BLD AUTO: 4.3 %
ERYTHROCYTE [DISTWIDTH] IN BLOOD BY AUTOMATED COUNT: 11.9 % (ref 11.5–17)
GFR SERPLBLD CREATININE-BSD FMLA CKD-EPI: >60 MLS/MIN/1.73/M2
GLOBULIN SER-MCNC: 2.4 GM/DL (ref 2.4–3.5)
GLUCOSE SERPL-MCNC: 100 MG/DL (ref 82–115)
HCT VFR BLD AUTO: 43.5 % (ref 42–52)
HDLC SERPL-MCNC: 41 MG/DL (ref 35–60)
HGB BLD-MCNC: 15 G/DL (ref 14–18)
IMM GRANULOCYTES # BLD AUTO: 0.01 X10(3)/MCL (ref 0–0.04)
IMM GRANULOCYTES NFR BLD AUTO: 0.2 %
LDLC SERPL CALC-MCNC: 47 MG/DL (ref 50–140)
LYMPHOCYTES # BLD AUTO: 1.81 X10(3)/MCL (ref 0.6–4.6)
LYMPHOCYTES NFR BLD AUTO: 32.3 %
MCH RBC QN AUTO: 35.9 PG (ref 27–31)
MCHC RBC AUTO-ENTMCNC: 34.5 G/DL (ref 33–36)
MCV RBC AUTO: 104.1 FL (ref 80–94)
MONOCYTES # BLD AUTO: 0.42 X10(3)/MCL (ref 0.1–1.3)
MONOCYTES NFR BLD AUTO: 7.5 %
NEUTROPHILS # BLD AUTO: 3.08 X10(3)/MCL (ref 2.1–9.2)
NEUTROPHILS NFR BLD AUTO: 55 %
NRBC BLD AUTO-RTO: 0 %
PLATELET # BLD AUTO: 141 X10(3)/MCL (ref 130–400)
PMV BLD AUTO: 10.1 FL (ref 7.4–10.4)
POTASSIUM SERPL-SCNC: 3.9 MMOL/L (ref 3.5–5.1)
PROT SERPL-MCNC: 6.5 GM/DL (ref 5.8–7.6)
RBC # BLD AUTO: 4.18 X10(6)/MCL (ref 4.7–6.1)
SODIUM SERPL-SCNC: 140 MMOL/L (ref 136–145)
TRIGL SERPL-MCNC: 85 MG/DL (ref 34–140)
TSH SERPL-ACNC: 2.08 UIU/ML (ref 0.35–4.94)
URATE SERPL-MCNC: 6.2 MG/DL (ref 3.5–7.2)
VLDLC SERPL CALC-MCNC: 17 MG/DL
WBC # SPEC AUTO: 5.6 X10(3)/MCL (ref 4.5–11.5)

## 2023-04-26 PROCEDURE — 80053 COMPREHEN METABOLIC PANEL: CPT

## 2023-04-26 PROCEDURE — 85025 COMPLETE CBC W/AUTO DIFF WBC: CPT

## 2023-04-26 PROCEDURE — 84550 ASSAY OF BLOOD/URIC ACID: CPT

## 2023-04-26 PROCEDURE — 80061 LIPID PANEL: CPT

## 2023-04-26 PROCEDURE — 36415 COLL VENOUS BLD VENIPUNCTURE: CPT

## 2023-04-26 PROCEDURE — 82306 VITAMIN D 25 HYDROXY: CPT

## 2023-04-26 PROCEDURE — 84443 ASSAY THYROID STIM HORMONE: CPT

## 2023-04-28 ENCOUNTER — OFFICE VISIT (OUTPATIENT)
Dept: INTERNAL MEDICINE | Facility: CLINIC | Age: 73
End: 2023-04-28
Payer: MEDICARE

## 2023-04-28 VITALS
SYSTOLIC BLOOD PRESSURE: 138 MMHG | RESPIRATION RATE: 18 BRPM | OXYGEN SATURATION: 96 % | WEIGHT: 258 LBS | DIASTOLIC BLOOD PRESSURE: 80 MMHG | BODY MASS INDEX: 36.94 KG/M2 | HEIGHT: 70 IN | HEART RATE: 71 BPM

## 2023-04-28 DIAGNOSIS — I10 PRIMARY HYPERTENSION: ICD-10-CM

## 2023-04-28 DIAGNOSIS — I25.10 ATHEROSCLEROSIS OF CORONARY ARTERY, UNSPECIFIED VESSEL OR LESION TYPE, UNSPECIFIED WHETHER ANGINA PRESENT, UNSPECIFIED WHETHER NATIVE OR TRANSPLANTED HEART: ICD-10-CM

## 2023-04-28 DIAGNOSIS — R53.81 MALAISE AND FATIGUE: ICD-10-CM

## 2023-04-28 DIAGNOSIS — J30.1 SEASONAL ALLERGIC RHINITIS DUE TO POLLEN: ICD-10-CM

## 2023-04-28 DIAGNOSIS — G47.33 OBSTRUCTIVE SLEEP APNEA ON CPAP: ICD-10-CM

## 2023-04-28 DIAGNOSIS — R53.83 MALAISE AND FATIGUE: ICD-10-CM

## 2023-04-28 DIAGNOSIS — I48.11 LONGSTANDING PERSISTENT ATRIAL FIBRILLATION: Primary | ICD-10-CM

## 2023-04-28 DIAGNOSIS — R25.1 TREMORS OF NERVOUS SYSTEM: ICD-10-CM

## 2023-04-28 PROCEDURE — 99213 OFFICE O/P EST LOW 20 MIN: CPT | Mod: ,,, | Performed by: INTERNAL MEDICINE

## 2023-04-28 PROCEDURE — 99213 PR OFFICE/OUTPT VISIT, EST, LEVL III, 20-29 MIN: ICD-10-PCS | Mod: ,,, | Performed by: INTERNAL MEDICINE

## 2023-04-28 NOTE — LETTER
AUTHORIZATION FOR RELEASE OF   CONFIDENTIAL INFORMATION    Dear Dr. Reid,    We are seeing Kevin Alva III, date of birth 1950, in the clinic at [place] [address] [facility]. Bucky Caputo MD is the patient's PCP. Kevin Alva III has an outstanding lab/procedure at the time we reviewed his chart. In order to help keep his health information updated, he has authorized us to request the following medical record(s):        (  )  MAMMOGRAM                                      (x)  COLONOSCOPY      (  )  PAP SMEAR                                          (  )  OUTSIDE LAB RESULTS     (  )  DEXA SCAN                                          (  )  EYE EXAM            (  )  FOOT EXAM                                          (  )  ENTIRE RECORD     (  )  OUTSIDE IMMUNIZATIONS                 (  )  _______________         Please fax records to YeimisBucky minaya MD, [phone]       If you have any questions, please contact Tawny at [phone]             Patient Name: Kevin Alva III  : 1950  Patient Phone #: [phone]

## 2023-04-28 NOTE — PROGRESS NOTES
Subjective:       Patient ID: Kevin Alva III is a 72 y.o. male.    Chief Complaint: Follow-up (6 mo-discuss labs 4/26)    HPI 72-year-old male history of coronary disease status post stents, history of hypertension obstructive sleep apnea 100% compliance with the, who in the recent past last year had stent placement by Dr. Du primary cardiologist with significant improvement of quality of life.  Patient able to ambulate now even walking 4 miles a day.  Patient considerable prices of some the medications.  Otherwise today he feels tired and fatigued  Review of Systems    Fatigue and tiredness today otherwise no chest pain or shortness of breath on exertion.    Good sleeping habits.    Denies nausea vomiting diarrhea constipation  Denies dysuria urgency frequency   No falls     Objective:      Physical Exam  patient alert oriented x3   HEENT within normal limits no bruits   Heart faint murmur 2/6   Lungs essentially clear bilateral no wheezing rales or rhonchi   Abdomen benign   Extremities no edema  Assessment:       1. Longstanding persistent atrial fibrillation    2. Primary hypertension    3. Atherosclerosis of coronary artery, unspecified vessel or lesion type, unspecified whether angina present, unspecified whether native or transplanted heart    4. Malaise and fatigue    5. Obstructive sleep apnea on CPAP    6. Tremors of nervous system    7. Seasonal allergic rhinitis due to pollen      Plan:       CPMM and Rx    Stable   Stable  able to walk   4 miles   Continue  exercises    100%  compliance   If worsen  call for  Rx     TC  6 months

## 2023-06-28 ENCOUNTER — TELEPHONE (OUTPATIENT)
Dept: INTERNAL MEDICINE | Facility: CLINIC | Age: 73
End: 2023-06-28
Payer: MEDICARE

## 2023-06-28 NOTE — TELEPHONE ENCOUNTER
Spoke to patient, advised of imaging recommended one year ago for monitoring purposes     Patient aware/verbalized understanding

## 2023-06-28 NOTE — TELEPHONE ENCOUNTER
----- Message from Patricia Estes sent at 6/28/2023 10:56 AM CDT -----  .Type:  Needs Medical Advice    Who Called: pt  Symptoms (please be specific):    How long has patient had these symptoms:    Pharmacy name and phone #:    Would the patient rather a call back or a response via [facility]?   Best Call Back Number: [phone]  Additional Information: pt said he wasn't aware of u/s kidney please call him

## 2023-07-12 ENCOUNTER — HOSPITAL ENCOUNTER (OUTPATIENT)
Dept: RADIOLOGY | Facility: HOSPITAL | Age: 73
Discharge: HOME | End: 2023-07-12
Attending: NURSE PRACTITIONER
Payer: MEDICARE

## 2023-07-12 DIAGNOSIS — N28.1 RENAL CYST, LEFT: ICD-10-CM

## 2023-07-12 PROCEDURE — 76770 US EXAM ABDO BACK WALL COMP: CPT | Mod: TC

## 2023-07-14 ENCOUNTER — TELEPHONE (OUTPATIENT)
Dept: INTERNAL MEDICINE | Facility: CLINIC | Age: 73
End: 2023-07-14
Payer: MEDICARE

## 2023-07-14 NOTE — TELEPHONE ENCOUNTER
----- Message from Tessa Ryan LPN sent at 7/14/2023  9:14 AM CDT -----    ----- Message -----  From: Glory Carpenter MA  Sent: 7/14/2023   8:28 AM CDT  To: Bucky Whittington MD      ----- Message -----  From: Marvin Rad Results In  Sent: 7/13/2023  10:29 AM CDT  To: Melissa Santiago NP

## 2023-07-14 NOTE — TELEPHONE ENCOUNTER
Per Dr Whittington US Retroperitonea Complete Kidney was essentially negative but there was a cyst on left kidney will monitor. Called pt, pt expressed understanding.

## 2023-07-17 DIAGNOSIS — N28.1 RENAL CYST, LEFT: Primary | ICD-10-CM

## 2023-08-13 ENCOUNTER — PATIENT MESSAGE (OUTPATIENT)
Dept: INTERNAL MEDICINE | Facility: CLINIC | Age: 73
End: 2023-08-13
Payer: MEDICARE

## 2023-09-11 ENCOUNTER — LAB REQUISITION (OUTPATIENT)
Dept: LAB | Facility: HOSPITAL | Age: 73
End: 2023-09-11
Payer: MEDICARE

## 2023-09-11 DIAGNOSIS — M27.2 INFLAMMATORY CONDITIONS OF JAWS: ICD-10-CM

## 2023-09-11 PROCEDURE — 87205 SMEAR GRAM STAIN: CPT | Performed by: DENTIST

## 2023-09-11 PROCEDURE — 87075 CULTR BACTERIA EXCEPT BLOOD: CPT | Performed by: DENTIST

## 2023-09-11 PROCEDURE — 87077 CULTURE AEROBIC IDENTIFY: CPT | Performed by: DENTIST

## 2023-09-12 LAB — GRAM STN SPEC: NORMAL

## 2023-09-14 LAB — BACTERIA WND CULT: NORMAL

## 2023-09-15 LAB — BACTERIA SPEC ANAEROBE CULT: ABNORMAL

## 2023-10-19 ENCOUNTER — HOSPITAL ENCOUNTER (OUTPATIENT)
Facility: HOSPITAL | Age: 73
Discharge: HOME | End: 2023-10-19
Attending: INTERNAL MEDICINE | Admitting: INTERNAL MEDICINE
Payer: MEDICARE

## 2023-10-19 VITALS
RESPIRATION RATE: 14 BRPM | OXYGEN SATURATION: 91 % | WEIGHT: 275.56 LBS | TEMPERATURE: 99 F | BODY MASS INDEX: 40.81 KG/M2 | HEART RATE: 60 BPM | HEIGHT: 69 IN | DIASTOLIC BLOOD PRESSURE: 85 MMHG | SYSTOLIC BLOOD PRESSURE: 141 MMHG

## 2023-10-19 DIAGNOSIS — I20.89 STABLE ANGINA: ICD-10-CM

## 2023-10-19 PROCEDURE — 99152 MOD SED SAME PHYS/QHP 5/>YRS: CPT | Performed by: INTERNAL MEDICINE

## 2023-10-19 PROCEDURE — 25500020 PHARM REV CODE 255: Performed by: INTERNAL MEDICINE

## 2023-10-19 PROCEDURE — 27201423 OPTIME MED/SURG SUP & DEVICES STERILE SUPPLY: Performed by: INTERNAL MEDICINE

## 2023-10-19 PROCEDURE — C1760 CLOSURE DEV, VASC: HCPCS | Performed by: INTERNAL MEDICINE

## 2023-10-19 PROCEDURE — 25000003 PHARM REV CODE 250: Performed by: INTERNAL MEDICINE

## 2023-10-19 PROCEDURE — C1769 GUIDE WIRE: HCPCS | Performed by: INTERNAL MEDICINE

## 2023-10-19 PROCEDURE — 93459 L HRT ART/GRFT ANGIO: CPT | Performed by: INTERNAL MEDICINE

## 2023-10-19 PROCEDURE — 63600175 PHARM REV CODE 636 W HCPCS: Performed by: INTERNAL MEDICINE

## 2023-10-19 DEVICE — ANGIO-SEAL VIP VASCULAR CLOSURE DEVICE
Type: IMPLANTABLE DEVICE | Site: GROIN | Status: FUNCTIONAL
Brand: ANGIO-SEAL

## 2023-10-19 RX ORDER — FENTANYL CITRATE 50 UG/ML
INJECTION, SOLUTION INTRAMUSCULAR; INTRAVENOUS
Status: DISCONTINUED | OUTPATIENT
Start: 2023-10-19 | End: 2023-10-19 | Stop reason: HOSPADM

## 2023-10-19 RX ORDER — DIAZEPAM 5 MG/1
10 TABLET ORAL
Status: DISPENSED | OUTPATIENT
Start: 2023-10-19

## 2023-10-19 RX ORDER — CEFAZOLIN SODIUM 1 G/3ML
INJECTION, POWDER, FOR SOLUTION INTRAMUSCULAR; INTRAVENOUS
Status: DISCONTINUED | OUTPATIENT
Start: 2023-10-19 | End: 2023-10-19 | Stop reason: HOSPADM

## 2023-10-19 RX ORDER — ACETAMINOPHEN 325 MG/1
650 TABLET ORAL EVERY 4 HOURS PRN
Status: DISCONTINUED | OUTPATIENT
Start: 2023-10-19 | End: 2023-10-19 | Stop reason: HOSPADM

## 2023-10-19 RX ORDER — MIDAZOLAM HYDROCHLORIDE 1 MG/ML
INJECTION INTRAMUSCULAR; INTRAVENOUS
Status: DISCONTINUED | OUTPATIENT
Start: 2023-10-19 | End: 2023-10-19 | Stop reason: HOSPADM

## 2023-10-19 RX ORDER — ONDANSETRON 4 MG/1
8 TABLET, ORALLY DISINTEGRATING ORAL EVERY 8 HOURS PRN
Status: DISCONTINUED | OUTPATIENT
Start: 2023-10-19 | End: 2023-10-19 | Stop reason: HOSPADM

## 2023-10-19 RX ORDER — SODIUM CHLORIDE 0.9 % (FLUSH) 0.9 %
10 SYRINGE (ML) INJECTION
Status: ACTIVE | OUTPATIENT
Start: 2023-10-19

## 2023-10-19 RX ORDER — LIDOCAINE HYDROCHLORIDE 10 MG/ML
INJECTION, SOLUTION EPIDURAL; INFILTRATION; INTRACAUDAL; PERINEURAL
Status: DISCONTINUED | OUTPATIENT
Start: 2023-10-19 | End: 2023-10-19 | Stop reason: HOSPADM

## 2023-10-19 RX ORDER — DIPHENHYDRAMINE HCL 25 MG
50 CAPSULE ORAL
Status: DISPENSED | OUTPATIENT
Start: 2023-10-19

## 2023-10-19 RX ORDER — SODIUM CHLORIDE 9 MG/ML
INJECTION, SOLUTION INTRAVENOUS ONCE
Status: ACTIVE | OUTPATIENT
Start: 2023-10-19

## 2023-10-19 RX ADMIN — DIPHENHYDRAMINE HYDROCHLORIDE 50 MG: 25 CAPSULE ORAL at 07:10

## 2023-10-19 RX ADMIN — DIAZEPAM 10 MG: 5 TABLET ORAL at 07:10

## 2023-10-19 NOTE — INTERVAL H&P NOTE
Patient name: Kevin Alva III  MRN: [id number]  : 1950  Cath Lab Procedure H&P Update    Pre-Procedure Assessment:    I saw and examined the patient face to face. The patient has been re-evaluated and his condition is unchanged. The reason for admission, procedure and care is still present.  Based on the patients H&P, pre-procedure physical exam, relevant diagnostic studies, NPO status and information obtained from the patient, I determine the patient is an appropriate candidate for the proposed procedure and anesthesia planned. I further certify the anesthesia risks, benefits and options have been explained to the patient to which he agrees as documented on the procedural consent.

## 2023-10-19 NOTE — DISCHARGE SUMMARY
[facility] - Cath Lab Services  Discharge Note  Short Stay    Procedure(s) (LRB):  Left heart cath (Left)      OUTCOME: Patient tolerated treatment/procedure well without complication and is now ready for discharge.    DISPOSITION: Home or Self Care    FINAL DIAGNOSIS:  CAD    FOLLOWUP: In clinic    DISCHARGE INSTRUCTIONS:  No discharge procedures on file.     TIME SPENT ON DISCHARGE: 31 minutes

## 2023-10-19 NOTE — INTERVAL H&P NOTE
Patient name: Kevin Alva III  MRN: [id number]  : 1950  Cath Lab Procedure H&P Update    Pre-Procedure Assessment:    I saw and examined the patient face to face. The patient has been re-evaluated and his condition is unchanged. The reason for admission, procedure and care is still present.  Based on the patients H&P, pre-procedure physical exam, relevant diagnostic studies, NPO status and information obtained from the patient, I determine the patient is an appropriate candidate for the proposed procedure and anesthesia planned. I further certify the anesthesia risks, benefits and options have been explained to the patient to which he agrees as documented on the procedural consent.

## 2023-10-19 NOTE — Clinical Note
The catheter was inserted into the ostial LIMA graft. An angiography was performed of the graft. The angiography was performed via power injection.  LIMA-LAD

## 2023-10-19 NOTE — Clinical Note
The catheter insertion attempt was made into the ostial LIMA graft. The catheter was unable to engage the area..JR

## 2023-10-19 NOTE — DISCHARGE SUMMARY
[facility] - Cath Lab Services  Discharge Note  Short Stay    Procedure(s) (LRB):  Left heart cath (Left)      OUTCOME: Patient tolerated treatment/procedure well without complication and is now ready for discharge.    DISPOSITION: Home or Self Care    FINAL DIAGNOSIS:  Branch vessel CAD    FOLLOWUP: In clinic    DISCHARGE INSTRUCTIONS:  No discharge procedures on file.     TIME SPENT ON DISCHARGE: 31 minutes

## 2023-10-19 NOTE — DISCHARGE INSTRUCTIONS
-Remove dressing in 24hrs  -No driving for two Days  -Do not lift anything heavier than a gallon of milk for 5 days  -No tub bathing for 5 days (if you have a groin site).   -No lotions, powders, creams around site for 5 days  - Return to the nearest emergency room if you start running a fever; have any kind of discharge coming from the site, the site looks red or swollen.  - If site starts to bleed, lay flat on the ground, apply pressure to the site and call 911.    RESUME Eliquis tomorrow.

## 2023-10-23 DIAGNOSIS — E55.9 VITAMIN D DEFICIENCY: ICD-10-CM

## 2023-10-23 DIAGNOSIS — E78.2 MIXED HYPERLIPIDEMIA: Primary | ICD-10-CM

## 2023-10-23 DIAGNOSIS — Z00.00 WELLNESS EXAMINATION: ICD-10-CM

## 2023-10-23 DIAGNOSIS — Z12.5 SCREENING FOR PROSTATE CANCER: ICD-10-CM

## 2023-10-23 DIAGNOSIS — I10 PRIMARY HYPERTENSION: ICD-10-CM

## 2023-10-25 ENCOUNTER — TELEPHONE (OUTPATIENT)
Dept: INTERNAL MEDICINE | Facility: CLINIC | Age: 73
End: 2023-10-25
Payer: MEDICARE

## 2023-10-25 NOTE — TELEPHONE ENCOUNTER
"----- Message from Reshma Blair LPN sent at 10/24/2023  8:01 AM CDT -----  Regarding: Dr. Whittington 11/01/2023 Wellness 0920  Are there any outstanding tasks in chart? No, but needs FASTING labs, TO BE DONE AT  "[facility]" or lab location of choice PRIOR to appt    Is there any documentation of tasks? no    Has the pt seen another physician, been to ER, C, or admitted to hospital since last visit?    Has the pt done blood work or imaging since last visit?    5. PLEASE HAVE PATIENT BRING MEDICATION LIST OR BOTTLES TO EVERY OFFICE VISIT      "

## 2023-11-01 ENCOUNTER — OFFICE VISIT (OUTPATIENT)
Dept: INTERNAL MEDICINE | Facility: CLINIC | Age: 73
End: 2023-11-01
Payer: MEDICARE

## 2023-11-01 VITALS
OXYGEN SATURATION: 97 % | HEART RATE: 85 BPM | RESPIRATION RATE: 18 BRPM | HEIGHT: 69 IN | WEIGHT: 275 LBS | SYSTOLIC BLOOD PRESSURE: 118 MMHG | DIASTOLIC BLOOD PRESSURE: 82 MMHG | BODY MASS INDEX: 40.73 KG/M2

## 2023-11-01 DIAGNOSIS — K04.7 TOOTH ABSCESS: ICD-10-CM

## 2023-11-01 DIAGNOSIS — E66.01 CLASS 3 OBESITY: ICD-10-CM

## 2023-11-01 DIAGNOSIS — Z00.00 ANNUAL PHYSICAL EXAM: ICD-10-CM

## 2023-11-01 DIAGNOSIS — I70.0 AORTIC ATHEROSCLEROSIS: ICD-10-CM

## 2023-11-01 DIAGNOSIS — I20.9 ANGINA PECTORIS: ICD-10-CM

## 2023-11-01 DIAGNOSIS — I10 PRIMARY HYPERTENSION: Primary | ICD-10-CM

## 2023-11-01 PROBLEM — E66.813 CLASS 3 OBESITY: Status: ACTIVE | Noted: 2023-11-01

## 2023-11-01 PROCEDURE — G0439 PR MEDICARE ANNUAL WELLNESS SUBSEQUENT VISIT: ICD-10-PCS | Mod: ,,, | Performed by: INTERNAL MEDICINE

## 2023-11-01 PROCEDURE — G0439 PPPS, SUBSEQ VISIT: HCPCS | Mod: ,,, | Performed by: INTERNAL MEDICINE

## 2023-11-01 NOTE — PROGRESS NOTES
Patient ID: Kevin Alva III is a 73 y.o. male.    Chief Complaint: Medicare AWV (No labs done prior)      Patient Care Team:  Bucky Whittington MD as PCP - General (Internal Medicine)  Linda Molina AGACNP-BC as Nurse Practitioner (Neurology)  Rolly Reid MD as Consulting Physician (Colon and Rectal Surgery)     HPI:   Patient presents here today for above reason.         The patient's Health Maintenance was reviewed and the following appears to be due at this time:   Health Maintenance Due   Topic Date Due    Hepatitis C Screening  Never done    TETANUS VACCINE  Never done    Shingles Vaccine (1 of 2) Never done    RSV Vaccine (Age 60+) (1 - 1-dose 60+ series) Never done    Influenza Vaccine (1) 09/01/2023    COVID-19 Vaccine (4 - 2023-24 season) 09/01/2023    Hemoglobin A1c (Prediabetes)  10/27/2023        Past Medical History:  Past Medical History:   Diagnosis Date    Hyperlipidemia     Hypertension     MILE on CPAP      Past Surgical History:   Procedure Laterality Date    APPENDECTOMY  1955    CORONARY ANGIOPLASTY WITH STENT PLACEMENT  03/2022    CORONARY ARTERY BYPASS GRAFT  2000    LEFT HEART CATHETERIZATION Left 10/19/2023    Procedure: Left heart cath;  Surgeon: Krystin Du MD;  Location: [facility] CATH LAB;  Service: Cardiology;  Laterality: Left;  LHC VIA RT FEMORAL    TONSILLECTOMY  1956     Review of patient's allergies indicates:   Allergen Reactions    Promethazine Hives     Current Outpatient Medications on File Prior to Visit   Medication Sig Dispense Refill    allopurinoL (ZYLOPRIM) 100 MG tablet TAKE 1 TABLET BY MOUTH EVERY DAY 90 tablet 3    apixaban (ELIQUIS) 5 mg Tab Take 5 mg by mouth 2 (two) times a day.      atorvastatin (LIPITOR) 40 MG tablet Take 40 mg by mouth nightly.      diltiaZEM (CARDIZEM CD) 120 MG Cp24 Take 120 mg by mouth once daily.      furosemide (LASIX) 40 MG tablet Take 40 mg by mouth daily as needed.      metoprolol succinate (TOPROL-XL) 100 MG 24 hr tablet  Take 100 mg by mouth every morning.      nitroGLYCERIN (NITROSTAT) 0.4 MG SL tablet DISSOLVE 1 TABLET UNDER THE TONGUE EVERY 5 MINUTES AS NEEDED FOR CHEST PAIN 100 tablet 4    ranolazine (RANEXA) 1,000 mg Tb12 Take 1,000 mg by mouth 2 (two) times daily.      valsartan (DIOVAN) 80 MG tablet Take 80 mg by mouth nightly.      VASCEPA 1 gram Cap Take 2 capsules by mouth 2 (two) times daily.       Current Facility-Administered Medications on File Prior to Visit   Medication Dose Route Frequency Provider Last Rate Last Admin    0.9%  NaCl infusion   Intravenous Once Krystin Du MD        diazePAM tablet 10 mg  10 mg Oral On Call Procedure Krystin Du MD   10 mg at 10/19/23 0746    diphenhydrAMINE capsule 50 mg  50 mg Oral On Call Procedure Krystin Du MD   50 mg at 10/19/23 0746    sodium chloride 0.9% flush 10 mL  10 mL Intravenous PRN Krystin Du MD         Social History     Socioeconomic History    Marital status:    Tobacco Use    Smoking status: Former    Smokeless tobacco: Current     Types: Chew   Substance and Sexual Activity    Alcohol use: Yes     Alcohol/week: 1.0 standard drink of alcohol     Types: 1 Drinks containing 0.5 oz of alcohol per week     Comment: Every now and then    Drug use: Never    Sexual activity: Not Currently     Partners: Female     Birth control/protection: Abstinence     Family History   Problem Relation Age of Onset    Cancer Mother         Lung    Cancer Father         Lung    Heart disease Father     Cancer Sister         Pancreatic       ROS:   Review of Systems  Constitutional: No weight gain, No fever, No chills, No fatigue.   Eyes: No blurring, No visual disturbances.   Ear/Nose/Mouth/Throat: No decreased hearing, No ear pain, No nasal congestion, No sore throat.   Respiratory: No shortness of breath, No cough, No wheezing.   Cardiovascular: No chest pain, No palpitations, No peripheral edema.   Gastrointestinal: No nausea, No vomiting, No  diarrhea, No constipation, No abdominal pain.   Genitourinary: No dysuria, No hematuria.   Hematology/Lymphatics: No bruising tendency, No bleeding tendency, No swollen lymph glands.   Endocrine: No excessive thirst, No polyuria, No excessive hunger.   Musculoskeletal: No joint pain, No muscle pain, No decreased range of motion.   Integumentary: No rash, No pruritus.   Neurologic: No abnormal balance, No confusion, No headache.   Psychiatric: No anxiety, No depression, Not suicidal, No hallucinations.        Patient Reported Health Risk Assessment  What [age] 70-79  Are you male or female?: Male  During the past four weeks, how much have you been bothered by emotional problems such as feeling anxious, depressed, irritable, sad, or downhearted and blue?: Not at all  During the past five weeks, has your physical and/or emotional health limited your social activities with family, friends, neighbors, or groups?: Not at all  During the past four weeks, how much bodily pain have you generally had?: Mild pain  During the past four weeks, was someone available to help if you needed and wanted help?: Yes, as much as I wanted  During the past four weeks, what was the hardest physical activity you could do for at least two minutes?: Moderate  Can you get to places out of walking distance without help?  (For example, can you travel alone on buses or taxis, or drive your own car?): Yes  Can you go shopping for groceries or clothes without someone's help?: Yes  Can you prepare your own meals?: Yes  Can you do your own housework without help?: Yes  Because of any health problems, do you need the help of another person with your personal care needs such as eating, bathing, dressing, or getting around the house?: No  Can you handle your own money without help?: Yes  During the past four weeks, how would you rate your health in general?: Good  How have things been going for you during the past four weeks?: Pretty well  Are  "you having difficulties driving your car?: No  Do you always fasten your seat belt when you are in a car?: Yes, usually  How often in the past four weeks have you been bothered by falling or dizzy when standing up?: Never  How often in the past four weeks have you been bothered by sexual problems?: Never  How often in the past four weeks have you been bothered by trouble eating well?: Never  How often in the past four weeks have you been bothered by teeth or denture problems?: Never  How often in the past four weeks have you been bothered with problems using the telephone?: Never  How often in the past four weeks have you been bothered by tiredness or fatigue?: Never  Have you fallen two or more times in the past year?: No  Are you afraid of falling?: No  Are you a smoker?: No  During the past four weeks, how many drinks of wine, beer, or other alcoholic beverages did you have?: One drink or less per week  Do you exercise for about 20 minutes three or more days a week?: Yes, some of the time  Have you been given any information to help you with hazards in your house that might hurt you?: No  Have you been given any information to help you with keeping track of your medications?: Yes  How often do you have trouble taking medicines the way you've been told to take them?: I always take them as prescribed  How confident are you that you can control and manage most of your health problems?: Very confident  What is your race? (Check all that apply.):       Vitals/PE:   /82 (BP Location: Left arm, Patient Position: Sitting)   Pulse 85   Resp 18   Ht 5' 9" (1.753 m)   Wt 124.7 kg (275 lb)   SpO2 97%   BMI 40.61 kg/m²   Physical Exam    General: Alert and oriented, No acute distress.   Eye: Normal conjunctiva without exudate.  HENMT: Normocephalic/AT, Normal hearing, Oral mucosa is moist and pink   Neck: No goiter visualized.   Respiratory: Lungs CTAB, Respirations are non-labored, Breath sounds are " equal, Symmetrical chest wall expansion.  Cardiovascular: Normal rate, Regular rhythm, No murmur, No edema.   Gastrointestinal: Non-distended.   Genitourinary: Deferred.  Musculoskeletal: Normal ROM, Normal gait, No deformities or amputations.  Integumentary: Warm, Dry, Intact. No diaphoresis, or flushing.  Neurologic: No focal deficits, Cranial Nerves II-XII are grossly intact.   Psychiatric: Cooperative, Appropriate mood & affect, Normal judgment, Non-suicidal.         No data to display                  11/1/2023     9:20 AM 4/28/2023    10:20 AM 10/31/2022     1:00 PM 8/25/2022     1:40 PM 8/5/2022     9:20 AM 7/20/2022     9:50 AM 5/26/2022     1:30 PM   Fall Risk Assessment - Outpatient   Mobility Status Ambulatory Ambulatory Ambulatory Ambulatory Ambulatory Ambulatory Ambulatory   Number of falls 0 0 0 0 0 0 0   Identified as fall risk False False False False False False False   Wrist band applied       True           Depression Screening  Over the past two weeks, has the patient felt down, depressed, or hopeless?: No  Over the past two weeks, has the patient felt little interest or pleasure in doing things?: No  Functional Ability/Safety Screening  Was the patient's timed Up & Go test unsteady or longer than 30 seconds?: No  Does the patient need help with phone, transportation, shopping, preparing meals, housework, laundry, meds, or managing money?: No  Does the patient's home have rugs in the hallway, lack grab bars in the bathroom, lack handrails on the stairs or have poor lighting?: No  Have you noticed any hearing difficulties?: No  Cognitive Function (Assessed through direct observation with due consideration of information obtained by way of patient reports and/or concerns raised by family, friends, caretakers, or others)    Does the patient repeat questions/statements in the same day?: No  Does the patient have trouble remembering the date, year, and time?: No  Does the patient have difficulty  managing finances?: No    Assessment/Plan:   ..  Problem List Items Addressed This Visit          ENT    Tooth abscess       Cardiac/Vascular    Angina pectoris    Hypertension - Primary    Aortic atherosclerosis       Endocrine    Class 3 obesity       Other    Annual physical exam      Recommendations:  Diet (healthy food choices, reduce portions and overall calorie intake)  Exercise 30-45 minutes at least 3x per week  Avoid excessive alcohol intake and tobacco use  Stay UTD with immunizations and preventative screenings   Yearly Labs     ..  Chart reviewed   cath reports  are Ok ,   refers  claudication , all meds  reviewed  stable on meds     ..No orders of the defined types were placed in this encounter.        I am having Kevin Alva III maintain his apixaban, atorvastatin, diltiaZEM, VASCEPA, metoprolol succinate, ranolazine, valsartan, furosemide, allopurinoL, and nitroGLYCERIN.    No orders of the defined types were placed in this encounter.      Education and counseling done face to face regarding medical conditions and plan. Contact office if new symptoms develop. Should any symptoms ever significantly worsen seek emergency medical attention/go to ER. Follow up at least yearly for wellness or sooner PRN. Nurse to call patient with any results. The patient is receptive, expresses understanding and is agreeable to plan. All questions have been answered.      Chart reviewed  CT abd  no abnormalities  , we will do Colon  stool occult blood     Advance Care Planning   I attest to discussing Advance Care Planning with patient and/or family member.  Education was provided including the importance of the Health Care Power of , Advance Directives, and/or [place] documentation.  The patient expressed understanding to the importance of this information and discussion.           Follow up in about 6 months (around 5/1/2024).

## 2024-02-05 PROBLEM — Z00.00 ANNUAL PHYSICAL EXAM: Status: RESOLVED | Noted: 2023-11-01 | Resolved: 2024-02-05

## 2024-03-15 NOTE — PROGRESS NOTES
Neurology Telemedicine Note  Patient treated using real-time Audio/Video, according to [facility] protocols  The patient (or their representative) stated that they understood & accepted the privacy/security risks to their info at their location.  Patient participated in the visit at a non-[facility] location selected by themself, or their representative.  Linda MEJIA NP, conducted the visit from the [facility] & am licensed in the state of [place], which is where the pt is currently located    CC: mile on cpap    HPI:   On cpap 18  The increase helped  avg at least 5h/night  Uses [facility]   Sleeping ok    Received a replacement cpap via mail from [facility]; has not taken it out of the box yet.  Settings need to be programmed.    Still coaching football @ [employer]  Thinking about retiring soon (he's volunteered f as a  for a total of 52 years)    ROS:  A 14pt ROS was reviewed & is negative unless otherwise documented in the HPI    OBJECTIVE:  GENERAL: NAD, calm, cooperative, appropriate  RESP: CTAB  HEART: RRR  no LE edema  MENTAL STATUS: Oriented x4, follows commands reliably  SPEECH/LANGUAGE: Clear, coherent  gaze conjugate  No tactile or motor facial asymmetry  t/p midline  Motor: No focal weakness  Cerebellar: No tremor or dysmetria    f2f time spent w/ pt exceeds 17 min, over 50% of which was used for education & counseling regarding medical conditions, current medications including risk/benefit & side effect/adverse events, otc meds - uses/doses, home self-care & contact precautions; red flags & indications for immediate medical attention. the patient is receptive, expresses understanding and is agreeable to the plan. All questions answered.     Problem List Items Addressed This Visit          Other    MILE on CPAP - Primary (Chronic)    Relevant Orders    CPAP/BIPAP SUPPLIES     PLAN:  Cont cpap to 18  Supplies renewed  Compliant/benefitting/medically necessary  DME: [facility]  He'll bring his new cpap by [facility]  to have the settings programmed (received it via mail  F/u 1y    Linda Molina, [facility]-BC

## 2024-03-18 ENCOUNTER — OFFICE VISIT (OUTPATIENT)
Dept: NEUROLOGY | Facility: CLINIC | Age: 74
End: 2024-03-18
Payer: MEDICARE

## 2024-03-18 DIAGNOSIS — G47.33 OSA ON CPAP: Primary | Chronic | ICD-10-CM

## 2024-03-18 PROCEDURE — 99213 OFFICE O/P EST LOW 20 MIN: CPT | Mod: 95,,, | Performed by: NURSE PRACTITIONER

## 2024-03-18 NOTE — PATIENT INSTRUCTIONS
"   Sleep Apnea in Adults   The Basics   Written by the doctors and editors at [facility]   What is sleep apnea? -- Sleep apnea is a condition that makes you stop breathing for short periods while you are asleep. There are 2 types of sleep apnea. One is called "obstructive sleep apnea," and the other is called "central sleep apnea."  In obstructive sleep apnea, you stop breathing because your throat narrows or closes (figure 1). In central sleep apnea, you stop breathing because your brain does not send the right signals to your muscles to make you breathe. When people talk about sleep apnea, they are usually referring to obstructive sleep apnea, which is what this article is about.  People with sleep apnea do not know that they stop breathing when they are asleep. But they do sometimes wake up startled or gasping for breath. They also often hear from loved ones that they snore.  What are the symptoms of sleep apnea? -- The main symptoms of sleep apnea are loud snoring, tiredness, and daytime sleepiness. Other symptoms can include:  Restless sleep  Waking up choking or gasping  Morning headaches, dry mouth, or sore throat  Waking up often to urinate  Waking up feeling unrested or groggy  Trouble thinking clearly or remembering things  Some people with sleep apnea don't have symptoms, or they don't know they have them. They might figure that it's normal to be tired or to snore a lot.  Should I see a doctor or nurse? -- Yes. If you think you might have sleep apnea, see your doctor.  Is there a test for sleep apnea? -- Yes. If your doctor or nurse suspects you have sleep apnea, they might send you for a "sleep study." Sleep studies can sometimes be done at home, but they are usually done in a sleep lab. For the study, you spend the night in the lab, and you are hooked up to different machines that monitor your heart rate, breathing, and other body functions. The results of the test will tell your doctor or nurse if you " "have the disorder.  Is there anything I can do on my own to help my sleep apnea? -- Yes. Here are some things that might help:  Stay off your back when sleeping. (This is not always practical, because people cannot control their position while asleep. Plus, it only helps some people.)  Lose weight, if you are overweight  Avoid alcohol, because it can make sleep apnea worse  How is sleep apnea treated? -- As mentioned above, weight loss can help if you are overweight or obese. But losing weight can be challenging, and it takes time to lose enough weight to help with your sleep apnea. Most people need other treatment while they work on losing weight.  The most effective treatment for sleep apnea is a device that keeps your airway open while you sleep. Treatment with this device is called "continuous positive airway pressure," or CPAP. People getting CPAP wear a face mask at night that keeps them breathing (figure 2).  If your doctor or nurse recommends a CPAP machine, try to be patient about using it. The mask might seem uncomfortable to wear at first, and the machine might seem noisy, but using the machine can really pay off. People with sleep apnea who use a CPAP machine feel more rested and generally feel better.  There is also another device that you wear in your mouth called an "oral appliance" or "mandibular advancement device." It also helps keep your airway open while you sleep. But devices do not work as well as CPAP for treating sleep apnea.  In rare cases, when nothing else helps, doctors recommend surgery to keep the airway open. Surgery to do this is not always effective, and even when it is, the problem can come back.  Is sleep apnea dangerous? -- It can be. People with sleep apnea do not get good-quality sleep, so they are often tired and not alert. This puts them at risk for car accidents and other types of accidents. Plus, studies show that people with sleep apnea are more likely than others to have " high blood pressure, heart attacks, and other serious heart problems. In people with severe sleep apnea, getting treated (for example, with a CPAP machine) can help prevent some of these problems.  All topics are updated as new evidence becomes available and our peer review process is complete.  This topic retrieved from [employer] on: Sep 21, 2021.  Topic 93468 Version 12.0  Release: 29.4.2 - C29.263  © 2021 UpToDate, Inc. and/or its affiliates. All rights reserved.  figure 1: Airway in a person with sleep apnea     Normally when a person sleeps, the airway remains open, and air can pass from the nose and mouth to the lungs. In a person with sleep apnea, parts of the throat and mouth drop into the airway and block off the flow of air. This can cause loud snoring and interrupt breathing for short periods.  Graphic 38904 Version 5.0    figure 2: Continuous positive airway pressure (CPAP) for sleep apnea     The CPAP mask gently blows air into your nose while you sleep. It puts just enough pressure on your airway to keep it from closing. The mask in this picture fits over just the nose. Other CPAP devices have masks that fit over the nose and mouth.  Graphic 06705 Version 5.0    Consumer Information Use and Disclaimer   This information is not specific medical advice and does not replace information you receive from your health care provider. This is only a brief summary of general information. It does NOT include all information about conditions, illnesses, injuries, tests, procedures, treatments, therapies, discharge instructions or life-style choices that may apply to you. You must talk with your health care provider for complete information about your health and treatment options. This information should not be used to decide whether or not to accept your health care provider's advice, instructions or recommendations. Only your health care provider has the knowledge and training to provide advice that is right for  you. The use of this information is governed by the [employer] End User License Agreement, available at https://www."[employer]/en/solutions/[employer]/about/rashmi.The use of [employer] content is governed by the [employer] Terms of Use. ©2021 UpToDate, Inc. All rights reserved.  Copyright   © 2021 UpToDate, Inc. and/or its affiliates. All rights reserved.

## 2024-03-31 DIAGNOSIS — M10.9 GOUT, UNSPECIFIED CAUSE, UNSPECIFIED CHRONICITY, UNSPECIFIED SITE: ICD-10-CM

## 2024-04-01 RX ORDER — ALLOPURINOL 100 MG/1
TABLET ORAL
Qty: 90 TABLET | Refills: 3 | Status: SHIPPED | OUTPATIENT
Start: 2024-04-01

## 2024-04-26 ENCOUNTER — TELEPHONE (OUTPATIENT)
Dept: INTERNAL MEDICINE | Facility: CLINIC | Age: 74
End: 2024-04-26
Payer: MEDICARE

## 2024-04-26 NOTE — TELEPHONE ENCOUNTER
----- Message from Reshma Blair LPN sent at 4/26/2024  7:51 AM CDT -----  Regarding: Dr. Whittington 05/06/2024 6 month rv 1:00pm  Are there any outstanding tasks in chart? No    Is there any documentation of tasks? No    Has the pt seen another physician, been to ER, UCC, or admitted to hospital since last visit?    Has the pt done blood work or imaging since last visit?     5. PLEASE HAVE PATIENT BRING MEDICATION LIST OR BOTTLES TO EVERY OFFICE VISIT

## 2024-05-06 ENCOUNTER — OFFICE VISIT (OUTPATIENT)
Dept: INTERNAL MEDICINE | Facility: CLINIC | Age: 74
End: 2024-05-06
Payer: MEDICARE

## 2024-05-06 VITALS
SYSTOLIC BLOOD PRESSURE: 122 MMHG | OXYGEN SATURATION: 98 % | DIASTOLIC BLOOD PRESSURE: 84 MMHG | HEART RATE: 92 BPM | HEIGHT: 69 IN | BODY MASS INDEX: 40.73 KG/M2 | WEIGHT: 275 LBS

## 2024-05-06 DIAGNOSIS — I70.0 AORTIC ATHEROSCLEROSIS: ICD-10-CM

## 2024-05-06 DIAGNOSIS — I10 PRIMARY HYPERTENSION: Primary | ICD-10-CM

## 2024-05-06 DIAGNOSIS — I48.11 LONGSTANDING PERSISTENT ATRIAL FIBRILLATION: ICD-10-CM

## 2024-05-06 DIAGNOSIS — I20.9 ANGINA PECTORIS: ICD-10-CM

## 2024-05-06 PROCEDURE — 99213 OFFICE O/P EST LOW 20 MIN: CPT | Mod: ,,, | Performed by: INTERNAL MEDICINE

## 2024-05-06 NOTE — PROGRESS NOTES
Patient ID: Kevin Alva III is a 73 y.o. male.  Chief Complaint: Follow-up (6 month )    HPI:     74 yo male  ASCHD , CAD   HBP  obesity  and  MILE 100% compliance , here on follow up , refers  an episode while on ambulation ,  lasted  24 hours , then resolved .  Has seen Cardiologist  we a normal  angiogram .   No recurrence     Current Outpatient Medications:     allopurinoL (ZYLOPRIM) 100 MG tablet, TAKE 1 TABLET BY MOUTH EVERY DAY, Disp: 90 tablet, Rfl: 3    apixaban (ELIQUIS) 5 mg Tab, Take 5 mg by mouth 2 (two) times a day., Disp: , Rfl:     atorvastatin (LIPITOR) 40 MG tablet, Take 40 mg by mouth nightly., Disp: , Rfl:     diltiaZEM (CARDIZEM CD) 120 MG Cp24, Take 120 mg by mouth once daily., Disp: , Rfl:     furosemide (LASIX) 40 MG tablet, Take 40 mg by mouth daily as needed., Disp: , Rfl:     metoprolol succinate (TOPROL-XL) 100 MG 24 hr tablet, Take 100 mg by mouth every morning., Disp: , Rfl:     nitroGLYCERIN (NITROSTAT) 0.4 MG SL tablet, DISSOLVE 1 TABLET UNDER THE TONGUE EVERY 5 MINUTES AS NEEDED FOR CHEST PAIN, Disp: 100 tablet, Rfl: 4    ranolazine (RANEXA) 1,000 mg Tb12, Take 1,000 mg by mouth 2 (two) times daily., Disp: , Rfl:     valsartan (DIOVAN) 80 MG tablet, Take 80 mg by mouth nightly., Disp: , Rfl:     VASCEPA 1 gram Cap, Take 2 capsules by mouth 2 (two) times daily., Disp: , Rfl:   No current facility-administered medications for this visit.    Facility-Administered Medications Ordered in Other Visits:     0.9%  NaCl infusion, , Intravenous, Once, Krystin Du MD    diazePAM tablet 10 mg, 10 mg, Oral, On Call Procedure, Krystin Du MD, 10 mg at 10/19/23 0746    diphenhydrAMINE capsule 50 mg, 50 mg, Oral, On Call Procedure, Krystin Du MD, 50 mg at 10/19/23 0746    sodium chloride 0.9% flush 10 mL, 10 mL, Intravenous, PRN, Krystin Du MD  ROS:   Constitutional: No weight gain, No fever, No chills, No fatigue.   Eyes: No blurring, No visual disturbances.  "  Ear/Nose/Mouth/Throat: No decreased hearing, No ear pain, No nasal congestion, No sore throat.   Respiratory: No shortness of breath, No cough, No wheezing.   Cardiovascular: No chest pain, No palpitations, No peripheral edema.   Gastrointestinal: No nausea, No vomiting, No diarrhea, No constipation, No abdominal pain.   Genitourinary: No dysuria, No hematuria.   Hematology/Lymphatics: No bruising tendency, No bleeding tendency, No swollen lymph glands.   Endocrine: No excessive thirst, No polyuria, No excessive hunger.   Musculoskeletal: No joint pain, No muscle pain, No decreased range of motion.   Integumentary: No rash, No pruritus.   Neurologic: No abnormal balance, No confusion, No headache.   Psychiatric: No anxiety, No depression, Not suicidal, No hallucinations.    PE/Vitals:   /84 (BP Location: Left arm, Patient Position: Sitting, BP Method: Medium (Manual))   Pulse 92   Ht 5' 9" (1.753 m)   Wt 124.7 kg (275 lb)   SpO2 98%   BMI 40.61 kg/m²   General: Alert and oriented, No acute distress.   Eye: Normal conjunctiva without exudate.  HENMT: Normocephalic/AT, Normal hearing, Oral mucosa is moist and pink   Neck: No goiter visualized.   Respiratory: Lungs CTAB, Respirations are non-labored, Breath sounds are equal, Symmetrical chest wall expansion.  Cardiovascular: Normal rate, Regular rhythm, No murmur, No edema.   Gastrointestinal: Non-distended.   Genitourinary: Deferred.  Musculoskeletal: Normal ROM, Normal gait, No deformities or amputations.  Integumentary: Warm, Dry, Intact. No diaphoresis, or flushing.  Neurologic: No focal deficits, Cranial Nerves II-XII are grossly intact.   Psychiatric: Cooperative, Appropriate mood & affect, Normal judgment, Non-suicidal.  Assessment/Plan   1. Primary hypertension  Comments:  stable  on Diltiazem  and  diovan    2. Angina pectoris  Comments:  stable  no angina    3. Aortic atherosclerosis  Comments:  on eliquis   closely  monitor by Cardiologist    4. " Longstanding persistent atrial fibrillation  Comments:  rate controlled  and on eliquis      No orders of the defined types were placed in this encounter.    Education and counseling done face to face regarding medical conditions and plan. Contact office if new symptoms develop. Should any symptoms ever significantly worsen seek emergency medical attention/go to ER. Follow up at least yearly for wellness or sooner PRN. Nurse to call patient with any results. The patient is receptive, expresses understanding and is agreeable to plan. All questions have been answered.  No follow-ups on file.

## 2024-06-04 ENCOUNTER — TELEPHONE (OUTPATIENT)
Dept: INTERNAL MEDICINE | Facility: CLINIC | Age: 74
End: 2024-06-04
Payer: MEDICARE

## 2024-06-04 NOTE — TELEPHONE ENCOUNTER
----- Message from [facility] sent at 6/4/2024 12:55 PM CDT -----  .Type:  Needs Medical Advice    Who Called:  pt    Symptoms (please be specific):  no     How long has patient had these symptoms:   no    Pharmacy name and phone #:   no    Would the patient rather a call back or a response via [facility]?      Best Call Back Number:  [phone]    Additional Information:  pt states he is getting calls about he needs a kidney test he is not aware of this he ask to verify this information and call him back thanks

## 2024-06-04 NOTE — TELEPHONE ENCOUNTER
Spoke to patient, advised him that back in 2022 Left Renal Cyst was indicated on imaging.    Follow up testing ordered for monitoring purposes    Patient verbalized understanding

## 2024-07-08 ENCOUNTER — HOSPITAL ENCOUNTER (OUTPATIENT)
Facility: HOSPITAL | Age: 74
Discharge: HOME | End: 2024-07-08
Attending: INTERNAL MEDICINE | Admitting: INTERNAL MEDICINE
Payer: MEDICARE

## 2024-07-08 DIAGNOSIS — Z45.010 ENCOUNTER FOR PACEMAKER AT END OF BATTERY LIFE: ICD-10-CM

## 2024-07-08 DIAGNOSIS — Z01.810 PREOP CARDIOVASCULAR EXAM: ICD-10-CM

## 2024-07-08 DIAGNOSIS — I49.9 ARRHYTHMIA: ICD-10-CM

## 2024-07-08 LAB
OHS QRS DURATION: 208 MS
OHS QRS DURATION: 216 MS
OHS QTC CALCULATION: 547 MS
OHS QTC CALCULATION: 550 MS

## 2024-07-08 PROCEDURE — 63600175 PHARM REV CODE 636 W HCPCS: Performed by: INTERNAL MEDICINE

## 2024-07-08 PROCEDURE — C1883 ADAPT/EXT, PACING/NEURO LEAD: HCPCS | Performed by: INTERNAL MEDICINE

## 2024-07-08 PROCEDURE — 99153 MOD SED SAME PHYS/QHP EA: CPT | Performed by: INTERNAL MEDICINE

## 2024-07-08 PROCEDURE — 25000003 PHARM REV CODE 250: Performed by: INTERNAL MEDICINE

## 2024-07-08 PROCEDURE — C1894 INTRO/SHEATH, NON-LASER: HCPCS | Performed by: INTERNAL MEDICINE

## 2024-07-08 PROCEDURE — 33228 REMV&REPLC PM GEN DUAL LEAD: CPT | Performed by: INTERNAL MEDICINE

## 2024-07-08 PROCEDURE — 93005 ELECTROCARDIOGRAM TRACING: CPT

## 2024-07-08 PROCEDURE — 27201423 OPTIME MED/SURG SUP & DEVICES STERILE SUPPLY: Performed by: INTERNAL MEDICINE

## 2024-07-08 PROCEDURE — 99152 MOD SED SAME PHYS/QHP 5/>YRS: CPT | Performed by: INTERNAL MEDICINE

## 2024-07-08 PROCEDURE — 93010 ELECTROCARDIOGRAM REPORT: CPT | Mod: 76,,, | Performed by: INTERNAL MEDICINE

## 2024-07-08 PROCEDURE — C1785 PMKR, DUAL, RATE-RESP: HCPCS | Performed by: INTERNAL MEDICINE

## 2024-07-08 DEVICE — IPG W1DR01 AZURE XT DR MRI USA
Type: IMPLANTABLE DEVICE | Site: CHEST  WALL | Status: FUNCTIONAL
Brand: AZURE™ XT DR MRI SURESCAN™

## 2024-07-08 RX ORDER — MIDAZOLAM HYDROCHLORIDE 1 MG/ML
INJECTION INTRAMUSCULAR; INTRAVENOUS
Status: DISCONTINUED | OUTPATIENT
Start: 2024-07-08 | End: 2024-07-08 | Stop reason: HOSPADM

## 2024-07-08 RX ORDER — DIPHENHYDRAMINE HYDROCHLORIDE 50 MG/ML
INJECTION INTRAMUSCULAR; INTRAVENOUS
Status: DISCONTINUED | OUTPATIENT
Start: 2024-07-08 | End: 2024-07-08 | Stop reason: HOSPADM

## 2024-07-08 RX ORDER — VANCOMYCIN HYDROCHLORIDE 1 G/20ML
INJECTION, POWDER, LYOPHILIZED, FOR SOLUTION INTRAVENOUS
Status: DISCONTINUED | OUTPATIENT
Start: 2024-07-08 | End: 2024-07-08 | Stop reason: HOSPADM

## 2024-07-08 RX ORDER — ACETAMINOPHEN 325 MG/1
650 TABLET ORAL EVERY 4 HOURS PRN
Status: DISCONTINUED | OUTPATIENT
Start: 2024-07-08 | End: 2024-07-08 | Stop reason: HOSPADM

## 2024-07-08 RX ORDER — SODIUM CHLORIDE 0.9 % (FLUSH) 0.9 %
10 SYRINGE (ML) INJECTION
Status: DISCONTINUED | OUTPATIENT
Start: 2024-07-08 | End: 2024-07-08 | Stop reason: HOSPADM

## 2024-07-08 RX ORDER — LIDOCAINE HYDROCHLORIDE 10 MG/ML
INJECTION, SOLUTION EPIDURAL; INFILTRATION; INTRACAUDAL; PERINEURAL
Status: DISCONTINUED | OUTPATIENT
Start: 2024-07-08 | End: 2024-07-08 | Stop reason: HOSPADM

## 2024-07-08 RX ORDER — VANCOMYCIN HYDROCHLORIDE 1 G/20ML
1000 INJECTION, POWDER, LYOPHILIZED, FOR SOLUTION INTRAVENOUS
Status: DISCONTINUED | OUTPATIENT
Start: 2024-07-08 | End: 2024-07-08 | Stop reason: HOSPADM

## 2024-07-08 RX ORDER — HYDROCODONE BITARTRATE AND ACETAMINOPHEN 5; 325 MG/1; MG/1
1 TABLET ORAL EVERY 4 HOURS PRN
Status: DISCONTINUED | OUTPATIENT
Start: 2024-07-08 | End: 2024-07-08 | Stop reason: HOSPADM

## 2024-07-08 RX ORDER — FENTANYL CITRATE 50 UG/ML
INJECTION, SOLUTION INTRAMUSCULAR; INTRAVENOUS
Status: DISCONTINUED | OUTPATIENT
Start: 2024-07-08 | End: 2024-07-08 | Stop reason: HOSPADM

## 2024-07-08 NOTE — NURSING
Patient is unhappy that he has not been to his procedure yet. Explained that Dr. Chino is currently on the case before him. DIDI from [facility] came to talk to him and explain to him the reason for the delay.

## 2024-07-08 NOTE — Clinical Note
The catheter was inserted in the right ventricle. The transvenous pacing lead was secured in place in the RV, was inserted into the RV and was placed and capture was confirmed. The pacer was paced at 70 BPM 5 mA 2 mV.

## 2024-07-08 NOTE — DISCHARGE SUMMARY
[facility] - [facility]  Discharge Note  Short Stay    Procedure(s) (LRB):  REPLACEMENT, PULSE GENERATOR OF DUAL CHAMBER PACEMAKER (N/A)      OUTCOME: Patient tolerated treatment/procedure well without complication and is now ready for discharge.    DISPOSITION: Home or Self Care    FINAL DIAGNOSIS:  Encounter for pacemaker at end of battery life    FOLLOWUP: In clinic    DISCHARGE INSTRUCTIONS:  No discharge procedures on file.     TIME SPENT ON DISCHARGE: 15 minutes

## 2024-07-08 NOTE — Clinical Note
The groin, chest and neck was prepped. The site was prepped with ChloraPrep and Ioban. The site was clipped. The patient was draped.

## 2024-07-08 NOTE — DISCHARGE INSTRUCTIONS
-Remove dressing in 24hrs  -No driving for two Days  -Do not lift anything heavier than a gallon of milk for 5 days  -No tub bathing for 5 days (if you have a groin site).   -No lotions, powders, creams around site for 5 days  - Return to the nearest emergency room if you start running a fever; have any kind of discharge coming from the site, the site looks red or swollen.  - If site starts to bleed, lay flat on the ground, apply pressure to the site and call 911.   Resume Eliquis in 2 days.

## 2024-07-08 NOTE — Clinical Note
A percutaneous stick to the right femoral vein was performed. Ultrasound guidance was used to obtain access. no

## 2024-07-10 VITALS
TEMPERATURE: 98 F | WEIGHT: 274.25 LBS | RESPIRATION RATE: 12 BRPM | SYSTOLIC BLOOD PRESSURE: 132 MMHG | HEIGHT: 69 IN | OXYGEN SATURATION: 94 % | BODY MASS INDEX: 40.62 KG/M2 | HEART RATE: 60 BPM | DIASTOLIC BLOOD PRESSURE: 74 MMHG

## 2024-07-11 ENCOUNTER — TELEPHONE (OUTPATIENT)
Dept: INTERNAL MEDICINE | Facility: CLINIC | Age: 74
End: 2024-07-11
Payer: MEDICARE

## 2024-07-11 NOTE — TELEPHONE ENCOUNTER
"Tried to contact patient to get more info about "Concerns".    No Answer/Left VM, Waiting on return call to further discuss   "

## 2024-07-11 NOTE — TELEPHONE ENCOUNTER
----- Message from Stephanie Veras sent at 7/10/2024  3:09 PM CDT -----  Who Called: Kevin Alva III    Caller is requesting assistance/information from provider's office.    Preferred Method of Contact: Phone Call  Patient's Preferred Phone Number on File: [phone]   Best Call Back Number, if different:  Additional Information: Kevin is requesting a call to discuss whether or not he can take green tea.  He also have some other concerns

## 2024-07-17 ENCOUNTER — HOSPITAL ENCOUNTER (OUTPATIENT)
Dept: RADIOLOGY | Facility: HOSPITAL | Age: 74
Discharge: HOME | End: 2024-07-17
Attending: NURSE PRACTITIONER
Payer: MEDICARE

## 2024-07-17 DIAGNOSIS — N28.1 RENAL CYST, LEFT: ICD-10-CM

## 2024-07-17 PROCEDURE — 76770 US EXAM ABDO BACK WALL COMP: CPT | Mod: TC

## 2024-10-30 ENCOUNTER — TELEPHONE (OUTPATIENT)
Dept: INTERNAL MEDICINE | Facility: CLINIC | Age: 74
End: 2024-10-30
Payer: MEDICARE

## 2024-10-31 ENCOUNTER — TELEPHONE (OUTPATIENT)
Dept: INTERNAL MEDICINE | Facility: CLINIC | Age: 74
End: 2024-10-31
Payer: MEDICARE

## 2024-11-07 ENCOUNTER — OFFICE VISIT (OUTPATIENT)
Dept: INTERNAL MEDICINE | Facility: CLINIC | Age: 74
End: 2024-11-07
Payer: MEDICARE

## 2024-11-07 VITALS
OXYGEN SATURATION: 97 % | HEART RATE: 86 BPM | HEIGHT: 69 IN | SYSTOLIC BLOOD PRESSURE: 130 MMHG | TEMPERATURE: 98 F | BODY MASS INDEX: 40.23 KG/M2 | WEIGHT: 271.63 LBS | DIASTOLIC BLOOD PRESSURE: 70 MMHG

## 2024-11-07 DIAGNOSIS — I48.11 LONGSTANDING PERSISTENT ATRIAL FIBRILLATION: ICD-10-CM

## 2024-11-07 DIAGNOSIS — I25.10 ATHEROSCLEROSIS OF NATIVE CORONARY ARTERY OF NATIVE HEART WITHOUT ANGINA PECTORIS: ICD-10-CM

## 2024-11-07 DIAGNOSIS — I10 PRIMARY HYPERTENSION: ICD-10-CM

## 2024-11-07 DIAGNOSIS — F17.200 TOBACCO DEPENDENCE: ICD-10-CM

## 2024-11-07 DIAGNOSIS — G47.33 OSA ON CPAP: Chronic | ICD-10-CM

## 2024-11-07 DIAGNOSIS — Z00.00 MEDICARE ANNUAL WELLNESS VISIT, SUBSEQUENT: Primary | ICD-10-CM

## 2024-11-07 DIAGNOSIS — E78.2 MIXED HYPERLIPIDEMIA: ICD-10-CM

## 2024-11-07 DIAGNOSIS — R53.83 MALAISE AND FATIGUE: ICD-10-CM

## 2024-11-07 DIAGNOSIS — G47.00 INSOMNIA, UNSPECIFIED TYPE: ICD-10-CM

## 2024-11-07 DIAGNOSIS — R42 VERTIGO: ICD-10-CM

## 2024-11-07 DIAGNOSIS — R25.1 TREMORS OF NERVOUS SYSTEM: ICD-10-CM

## 2024-11-07 DIAGNOSIS — Z23 NEED FOR VACCINATION: ICD-10-CM

## 2024-11-07 DIAGNOSIS — E66.813 CLASS 3 SEVERE OBESITY DUE TO EXCESS CALORIES WITHOUT SERIOUS COMORBIDITY WITH BODY MASS INDEX (BMI) OF 40.0 TO 44.9 IN ADULT: ICD-10-CM

## 2024-11-07 DIAGNOSIS — R53.81 MALAISE AND FATIGUE: ICD-10-CM

## 2024-11-07 DIAGNOSIS — E66.01 CLASS 3 SEVERE OBESITY DUE TO EXCESS CALORIES WITHOUT SERIOUS COMORBIDITY WITH BODY MASS INDEX (BMI) OF 40.0 TO 44.9 IN ADULT: ICD-10-CM

## 2024-11-07 DIAGNOSIS — M1A.0710 IDIOPATHIC CHRONIC GOUT OF RIGHT FOOT WITHOUT TOPHUS: ICD-10-CM

## 2024-11-07 PROBLEM — I20.9 ANGINA PECTORIS: Status: RESOLVED | Noted: 2022-08-05 | Resolved: 2024-11-07

## 2024-11-07 PROBLEM — I48.92 ATRIAL FLUTTER: Status: ACTIVE | Noted: 2024-11-07

## 2024-11-07 PROBLEM — R06.81 APNEA: Status: RESOLVED | Noted: 2022-08-05 | Resolved: 2024-11-07

## 2024-11-07 PROBLEM — R19.7 DIARRHEA: Status: RESOLVED | Noted: 2020-11-24 | Resolved: 2024-11-07

## 2024-11-07 PROBLEM — K04.7 TOOTH ABSCESS: Status: RESOLVED | Noted: 2023-11-01 | Resolved: 2024-11-07

## 2024-11-07 PROBLEM — M79.10 MYALGIA: Status: RESOLVED | Noted: 2022-08-05 | Resolved: 2024-11-07

## 2024-11-07 PROBLEM — Z72.0 TOBACCO USER: Status: RESOLVED | Noted: 2022-08-05 | Resolved: 2024-11-07

## 2024-11-07 PROBLEM — N40.0 BENIGN PROSTATIC HYPERPLASIA WITHOUT URINARY OBSTRUCTION: Status: ACTIVE | Noted: 2023-09-19

## 2024-11-07 PROBLEM — Z20.828 CONTACT WITH AND (SUSPECTED) EXPOSURE TO OTHER VIRAL COMMUNICABLE DISEASES: Status: RESOLVED | Noted: 2020-10-09 | Resolved: 2024-11-07

## 2024-11-07 NOTE — ASSESSMENT & PLAN NOTE
Wears CPAP and reports compliance nightly. Life time use expected.  Avoid excessive alcohol, smoking and overuse of sedatives at bedtime.  Weight management discussed. Goal BMI <30.  Adjust sleep position PRN.

## 2024-11-07 NOTE — ASSESSMENT & PLAN NOTE
[facility]  Medical Oncology & Hematology  Consult    Date of Service: 6/7/2023  Physician Requesting Consult: Sarah Antony DO  Reason For Consult: Leukopenia     Chief Complaint:   Chief Complaint   Patient presents with   • Consultation     Leukopenia- Dr Antony referring     History of Present Illness:   Cecilia Farooq is a 35 year old female with no pertinent medical history, who is seen in consultation at the kind request of Sarah Antony DO, for evaluation of Leukopenia.     The patient attends this visit alone.   Today, she reports being told she has a low WBC count. She has recent UTI she notes being prone to getting a UTI a few times a year.  She reports January was her last infection.   She denies yeast infections.   She denies being on a B12 supplement.   She reports she tends to get sick with when her children get sick. She reports her son had strep recently and she was also tested but was negative.     She denies any unintentional wt loss.   She denies fever. She notes when she has Sx's of PMS, she gets hot flashes.     She reports feeling like she has acid reflux every night. She has concerns with vitamin absorption. She reports a family member with esophageal cancer. She reports she has been proactive with eating earlier and elevating her head at night as well as being more active after dinner. She denies taking anything to control her reflux.    Hematology History:   Component Ref Range & Units 13 d ago  (5/25/23) 1 yr ago  (9/14/21) 4 yr ago  (5/16/19) 4 yr ago  (11/28/18) 4 yr ago  (9/4/18) 5 yr ago  (4/17/18) 7 yr ago  (4/21/16)   WBC 4.2 - 11.0 K/mcL 3.5 Low   5.3  3.9 Low   9.7  8.7  7.2     RBC 4.00 - 5.20 mil/mcL 4.24  4.38  4.17  3.94 Low   3.69 Low   3.93 Low      HGB 12.0 - 15.5 g/dL 13.0  13.7  12.9  12.3  11.7 Low   12.6  10.9 Low  R, CM    HCT 36.0 - 46.5 % 39.7  41.6  39.0  36.9  35.5 Low   36.6     Absolute Eosinophils  0.0 - 0.5 K/mcL 0.0  0.0  0.0 Low  R   Wellness labs to be completed at patient's earliest convenience  He is generally stable for today's visit  Age-appropriate screenings and immunizations reviewed and discussed  Encouraged routine aerobic exercise 2-3 times per week  Follow up in 6 months or sooner if needed     0.0 Low  R           ROS: I reviewed and agree with ROS as documented during this encounter.    Past Medical History  History reviewed. No pertinent past medical history.    Allergies  ALLERGIES:   Allergen Reactions   • Linalool PRURITUS and Other (See Comments)       Past Surgical History  History reviewed. No pertinent surgical history.     Family History  Family History   Problem Relation Age of Onset   • High cholesterol Mother    • Cancer Paternal Grandmother         uterine cancer   • Myocardial Infarction Paternal Grandfather    • Miscarriages / Stillbirths Sister    • Stroke Maternal Grandfather        Tobacco Use  Social History     Tobacco Use   Smoking Status Never   Smokeless Tobacco Never       Alcohol History  Social History     Substance and Sexual Activity   Alcohol Use Yes    Comment: occ not while pregnant       Drug History  Social History     Substance and Sexual Activity   Drug Use No       Social History   with children    Current Medications  Current Outpatient Medications   Medication Sig Dispense Refill   • hyoscyamine (LEVSIN SL) 0.125 MG sublingual tablet Place 1-2 tablets under the tongue every 6 hours as needed for Cramping. 20 tablet 0   • COVID-19 mRNA, Pfizer, 30 MCG/0.3ML vaccine Inject 0.3 mLs into the muscle. 0.3 mL 0   • Omega-3 Fatty Acids (FISH OIL) 1000 MG capsule Take 2 g by mouth daily.     • Probiotic Product (PROBIOTIC ADVANCED PO)      • cholecalciferol (VITAMIN D3) 1000 UNITS tablet Take 4,000 Units by mouth daily.       No current facility-administered medications for this visit.       Vital Signs  Visit Vitals  /69   Pulse 88   Temp 97.6 °F (36.4 °C)   Resp 18   Ht 5' 4\" (1.626 m)   Wt 54.4 kg (120 lb)   LMP 02/20/2023 (Approximate)   SpO2 100%   BMI 20.60 kg/m²       Physical Exam  VS: Vital signs as noted above  Gen: no apparent distress, well-nourished  HEENT: oropharynx clear and moist, sclerae anicteric, no conjunctival pallor, neck supple  Lungs:  clear to auscultation bilaterally with no wheezes, rales, or rhonchi. No increased work of breathing.  CV: regular rate & rhythm, no murmurs, rubs or gallops  Lymph: no axillary, cervical or supra-, or infraclavicular lymphadenopathy. No inguinal adenopathy.   Abdomen: soft, non-tender, non-distended, no hepatosplenomegaly appreciated  Ext: warm & well perfused, no edema  Skin: warm, dry, and intact with no rashes, ulcers, bruises, or petechiae  Neuro: alert & oriented x 3, moving all extremities, normal gait  Psych: appropriate affect  *Rest of exam deferred- due to social distancing.     ECOG Performance Status  ECOG [06/07/23 0836]   ECOG Performance Status 0       Data: Available labs, radiology, and pathology reports were reviewed.  Recent Labs   Lab 05/25/23  0906   WBC 3.5*   RBC 4.24   HGB 13.0   HCT 39.7   MCV 93.6      Absolute Neutrophils 1.9     Recent Labs   Lab 05/25/23  0906   Sodium 141   Potassium 3.9   Chloride 105   Carbon Dioxide 26   BUN 13   Creatinine 0.65   Glucose 87   Calcium 9.1   Albumin 4.2   Globulin 3.5   Bilirubin, Total 0.7   GOT/AST 20   Alkaline Phosphatase 46   GPT 26   Glomerular Filtration Rate >90   No results for input(s): LDH in the last 8765 hours.      Assessment:  Ms. Farooq is a 35 year old female with...     Leukopenia  - mild, intermittent and without absolute neutropenia or lymphopenia  - no other cytopenias  - has had multiple infections the past year including UTI and recurrent viral URI - living with school age children.  No hospitalizations for infections  - viral illnesses may be resulting in transient leukopenia. Will also evaluate for thyroid disease and check quantitative immunoglobulines  - will evaluate for vitamin deficiencies  - continue to trend WBC count and would consider further work-up if cytopenia progresses or new cytopenias appear    Discussed with patient the natural history, course and prognosis of her low WBC count.  Therapeutic options  discussed and explained.  Side effects, risks and benefits, and alternatives were also discussed.  Patient acknowledges understanding of disease and treatment plan.     Acid Reflux  - doing behavioral modification to reduce reflux  - Referral to GI to discuss further assessment and management    Plan:  · Discussed the nature of the patient's lab trends.   · Acid reflux- Referral to GI  · Advised to start B12 vitamin   · Follow up in 2 weeks for repeat labs (CBC, CMP, TSH, B12, and immunoglobulins quantitative). Will call her with results.  Will add smear if WBC remains low  · Follow up in about 6 weeks for provider visit and repeat labs (CBC)     Orders Today  Orders Placed This Encounter   • CBC with Automated Differential   • Vitamin B12   • Thyroid Stimulating Hormone Reflex   • Lactate Dehydrogenase   • Immunoglobulins Quantitative   • SERVICE TO GASTROENTEROLOGY      Follow-up  Return in about 6 weeks (around 7/19/2023) for Provider visit with labs.     Pain Care Plan: Patient does not report pain.    On 6/7/2023, NADEEM EPPS scribed the services personally performed by Marcia To MD     The documentation recorded by the scribe accurately and completely reflects the service(s) I personally performed and the decisions made by me during this encounter.    Marcia To MD

## 2024-11-07 NOTE — ASSESSMENT & PLAN NOTE
Body mass index is 40.11 kg/m²..  Goal BMI <30.  Recommend exercise 5 times a week for 30 minutes per day.  Avoid soda, simple sugars, excessive rice, potatoes or bread. Limit fast foods and fried foods.  Choose complex carbs in moderation (example: green vegetables, beans, oatmeal).   Eat plenty of fresh fruits and vegetables with lean meats daily.  Do not skip meals. Eat a balanced portion size.  Avoid fad diets. Consider permanent healthy lifestyle changes.

## 2024-11-07 NOTE — ASSESSMENT & PLAN NOTE
No recent flares  Continue Allopurinal 100mg daily  Stay well hydrated by increasing water intake throughout the day.  Stressed importance of exercise and weight loss to maintain BMI <30.  Avoid alcohol, sodas, organ/glandular meats (liver, kidney, sweetbreads). Limit seafood and red meat intake.  Eat a balanced diet of fruits, vegetables, complex carbohydrates, and lean sources of protein (boneless/skinless chicken breasts, salmon, lentils, low fat dairy).

## 2024-11-07 NOTE — ASSESSMENT & PLAN NOTE
Repeat lipid panel pending  Currently on Atorvastatin 40mg nightly and Vascepa 2cap BID.  Stressed importance of dietary modifications. Follow a low cholesterol, low saturated fat diet with less that 200mg of cholesterol a day.  Avoid fried foods and high saturated fats (high saturated fats less than 7% of calories).  Regular exercise can reduce LDL and raise HDL. Stressed importance of physical activity 5 times per week for 30 minutes per day.

## 2024-11-07 NOTE — ASSESSMENT & PLAN NOTE
Rate controlled  Currently on Metoprolol succinate 100mg in AM and 50mg in PM  Anticoagulated with Eliquis 5mg BID  Followed by cardiology

## 2024-11-07 NOTE — PROGRESS NOTES
Internal Medicine      Patient Name:  Kevin Alva III   Patient ID: [id number]     Chief Complaint: Medicare AWV      HPI:     Kevin Alva III is a 74 y.o. male here today for a Medicare Annual Wellness visit and comprehensive Health Risk Assessment.  Patient did not complete labs prior to wellness appointment.  States he will have labs completed at earliest convenience.  Age appropriate screening and recommended vaccinations reviewed and discussed with patient.    Since LOV, patient had PPM battery replaced (7/2024).  Overall he is doing well.  States he is having trouble with sleeping.  Does not go to bed until 4am.  Also has complaints of decreased energy/fatigue that has been intermittent for the past few years.  States this usually resolves by the following day.  Denies fever, chills, night sweats, weight loss.      Advance Care Planning   Today we discussed advance care planning. He is interested in learning more about how to make Advance Directives. Information was provided and I offered to discuss more at his discretion.      Health Maintenance         Date Due Completion Date    Hepatitis C Screening Never done ---    TETANUS VACCINE Never done ---    Shingles Vaccine (1 of 2) Never done ---    RSV Vaccine (Age 60+ and Pregnant patients) (1 - Risk 60-74 years 1-dose series) Never done ---    COVID-19 Vaccine (4 - 2024-25 season) 09/01/2024 12/20/2021    Colorectal Cancer Screening 09/28/2027 9/28/2017    Lipid Panel 06/25/2029 6/25/2024        Past Medical History:   Diagnosis Date    Hyperlipidemia     Hypertension     MILE on CPAP         Past Surgical History:   Procedure Laterality Date    APPENDECTOMY  1955    CORONARY ANGIOPLASTY WITH STENT PLACEMENT  03/2022    CORONARY ARTERY BYPASS GRAFT  2000    LEFT HEART CATHETERIZATION Left 10/19/2023    Procedure: Left heart cath;  Surgeon: Krystin Du MD;  Location: [facility] CATH LAB;  Service: Cardiology;  Laterality: Left;  LHC VIA RT FEMORAL     REPLACEMENT OF DUAL CHAMBER PACEMAKER GENERATOR N/A 7/8/2024    Procedure: REPLACEMENT, PULSE GENERATOR OF DUAL CHAMBER PACEMAKER;  Surgeon: Uri Chino MD;  Location: [facility] CATH LAB;  Service: Cardiology;  Laterality: N/A;  DUAL CHAMBER PPM GEN CHANGE (MDT) *TEMP PACING*    TONSILLECTOMY  1956        Social History     Socioeconomic History    Marital status:    Tobacco Use    Smoking status: Former    Smokeless tobacco: Current     Types: Chew   Substance and Sexual Activity    Alcohol use: Yes     Alcohol/week: 1.0 standard drink of alcohol     Types: 1 Drinks containing 0.5 oz of alcohol per week     Comment: Every now and then    Drug use: Never    Sexual activity: Not Currently     Partners: Female     Birth control/protection: Abstinence     Social Drivers of Health     Financial Resource Strain: Low Risk  (11/7/2024)    Overall Financial Resource Strain (CARDIA)     Difficulty of Paying Living Expenses: Not hard at all   Food Insecurity: No Food Insecurity (11/7/2024)    Hunger Vital Sign     Worried About Running Out of Food in the Last Year: Never true     Ran Out of Food in the Last Year: Never true   Transportation Needs: No Transportation Needs (11/7/2024)    TRANSPORTATION NEEDS     Transportation : No   Physical Activity: Sufficiently Active (11/7/2024)    Exercise Vital Sign     Days of Exercise per Week: 4 days     Minutes of Exercise per Session: 60 min   Stress: No Stress Concern Present (11/7/2024)    [employer] - Occupational Stress Questionnaire     Feeling of Stress : Not at all   Housing Stability: Low Risk  (11/7/2024)    Housing Stability Vital Sign     Unable to Pay for Housing in the Last Year: No     Homeless in the Last Year: No        Family History   Problem Relation Name Age of Onset    Cancer Mother Abbi Magaña         Lung    Cancer Father Kevin Alva Jr.         Lung    Heart disease Father Kevin Alva Jr.     Cancer Sister  Tameka Alva         Pancreatic        Current Outpatient Medications   Medication Instructions    allopurinoL (ZYLOPRIM) 100 MG tablet TAKE 1 TABLET BY MOUTH EVERY DAY    apixaban (ELIQUIS) 5 mg, 2 times daily    atorvastatin (LIPITOR) 40 mg, Nightly    diltiaZEM (CARDIZEM CD) 120 mg, Daily    furosemide (LASIX) 40 mg, Daily PRN    metoprolol succinate (TOPROL-XL) 100 mg, Every morning    nitroGLYCERIN (NITROSTAT) 0.4 MG SL tablet DISSOLVE 1 TABLET UNDER THE TONGUE EVERY 5 MINUTES AS NEEDED FOR CHEST PAIN    ranolazine (RANEXA) 1,000 mg, 2 times daily    valsartan (DIOVAN) 80 mg, Nightly    VASCEPA 1 gram Cap 2 capsules, 2 times daily       Review of patient's allergies indicates:   Allergen Reactions    Promethazine Hives        Immunization History   Administered Date(s) Administered    COVID-19, MRNA, LN-S, PF (Pfizer) (Purple Cap) 01/10/2021, 01/31/2021, 12/20/2021    Influenza - Quadrivalent - High Dose - PF (65 years and older) 10/28/2020, 11/25/2022    Influenza - Trivalent - Afluria, Fluzone MDV 09/30/2009, 10/14/2013, 09/12/2014    Influenza - Trivalent - Fluad - Adjuvanted - PF (65 years and older 11/25/2017, 11/08/2018    Influenza - Trivalent - Fluarix, Flulaval, Fluzone, Afluria - PF 09/30/2009, 10/14/2013, 09/12/2014, 09/15/2014, 11/25/2017, 10/26/2021    Influenza - Trivalent - Fluzone High Dose - PF (65 years and older) 11/30/2016, 11/02/2019, 11/25/2022    Influenza A (H1N1) 2009 Monovalent - IM 11/14/2009    Pneumococcal Conjugate - 20 Valent 08/05/2022        Patient Care Team:  Bucky Whittington MD as PCP - General (Internal Medicine)  Linda Molina NP as Nurse Practitioner (Neurology)  Rolly Reid MD as Consulting Physician (Colon and Rectal Surgery)    Subjective:     Review of Systems   Constitutional:  Positive for fatigue (intermittent). Negative for appetite change, chills, diaphoresis and fever.   HENT:  Negative for congestion, ear pain, sinus pain and sore throat.   "  Eyes:  Negative for pain and visual disturbance.   Respiratory:  Negative for cough, shortness of breath and wheezing.    Cardiovascular:  Negative for chest pain, palpitations and leg swelling.   Gastrointestinal:  Negative for abdominal pain, constipation, diarrhea, nausea and vomiting.   Endocrine: Negative for cold intolerance.   Genitourinary:  Negative for difficulty urinating, dysuria, frequency and hematuria.   Musculoskeletal:  Negative for arthralgias and myalgias.   Skin:  Negative for color change and rash.   Allergic/Immunologic: Negative.    Neurological: Negative.  Negative for dizziness, syncope, light-headedness and numbness.   Hematological: Negative.    Psychiatric/Behavioral: Negative.  Negative for dysphoric mood. The patient is not nervous/anxious.    All other systems reviewed and are negative.      Objective:     Visit Vitals  /70 (BP Location: Left arm, Patient Position: Sitting)   Pulse 86   Temp 97.9 °F (36.6 °C) (Temporal)   Ht 5' 9" (1.753 m)   Wt 123.2 kg (271 lb 9.6 oz)   SpO2 97%   BMI 40.11 kg/m²       Physical Exam  Vitals and nursing note reviewed.   Constitutional:       General: He is not in acute distress.     Appearance: He is not ill-appearing.   HENT:      Head: Normocephalic and atraumatic.      Right Ear: Tympanic membrane and ear canal normal.      Left Ear: Tympanic membrane and ear canal normal.      Mouth/Throat:      Mouth: Mucous membranes are moist.      Pharynx: Oropharynx is clear.   Eyes:      General: No scleral icterus.     Extraocular Movements: Extraocular movements intact.      Conjunctiva/sclera: Conjunctivae normal.      Pupils: Pupils are equal, round, and reactive to light.   Neck:      Vascular: No carotid bruit.   Cardiovascular:      Rate and Rhythm: Normal rate and regular rhythm.      Heart sounds: Normal heart sounds. No murmur heard.     No friction rub. No gallop.   Pulmonary:      Effort: Pulmonary effort is normal. No respiratory " distress.      Breath sounds: Normal breath sounds. No wheezing, rhonchi or rales.   Abdominal:      General: Bowel sounds are normal. There is no distension.      Palpations: Abdomen is soft. There is no mass.      Tenderness: There is no abdominal tenderness.   Musculoskeletal:         General: Normal range of motion.      Cervical back: Normal range of motion and neck supple.   Lymphadenopathy:      Cervical: No cervical adenopathy.   Skin:     General: Skin is warm and dry.      Capillary Refill: Capillary refill takes less than 2 seconds.   Neurological:      General: No focal deficit present.      Mental Status: He is alert and oriented to person, place, and time.   Psychiatric:         Mood and Affect: Mood normal.         Behavior: Behavior normal.         Assessment:       ICD-10-CM ICD-9-CM   1. Medicare annual wellness visit, subsequent  Z00.00 V70.0   2. Malaise and fatigue  R53.81 780.79    R53.83    3. Class 3 severe obesity due to excess calories without serious comorbidity with body mass index (BMI) of 40.0 to 44.9 in adult  E66.813 278.01    E66.01 V85.41    Z68.41    4. Atherosclerosis of native coronary artery of native heart without angina pectoris  I25.10 414.01   5. Longstanding persistent atrial fibrillation  I48.11 427.31   6. Primary hypertension  I10 401.9   7. Mixed hyperlipidemia  E78.2 272.2   8. MILE on CPAP  G47.33 327.23   9. Idiopathic chronic gout of right foot without tophus  M1A.0710 274.02   10. Insomnia, unspecified type  G47.00 780.52   11. Tremors of nervous system  R25.1 781.0   12. Vertigo  R42 780.4   13. Tobacco dependence  F17.200 305.1   14. Need for vaccination  Z23 V05.9        Plan:     1. Medicare annual wellness visit, subsequent  Overview:  Health Maintenance Due   Topic Date Due    TETANUS VACCINE  Never done    Shingles Vaccine (1 of 2) Never done    RSV Vaccine (Age 60+ and Pregnant patients) (1 - Risk 60-74 years 1-dose series) Never done       Assessment &  Plan:  Wellness labs to be completed at patient's earliest convenience  He is generally stable for today's visit  Age-appropriate screenings and immunizations reviewed and discussed  Encouraged routine aerobic exercise 2-3 times per week  Follow up in 6 months or sooner if needed        2. Malaise and fatigue  Assessment & Plan:  Labs pending      3. Class 3 severe obesity due to excess calories without serious comorbidity with body mass index (BMI) of 40.0 to 44.9 in adult  Assessment & Plan:  Body mass index is 40.11 kg/m²..  Goal BMI <30.  Recommend exercise 5 times a week for 30 minutes per day.  Avoid soda, simple sugars, excessive rice, potatoes or bread. Limit fast foods and fried foods.  Choose complex carbs in moderation (example: green vegetables, beans, oatmeal).   Eat plenty of fresh fruits and vegetables with lean meats daily.  Do not skip meals. Eat a balanced portion size.  Avoid fad diets. Consider permanent healthy lifestyle changes.      4. Atherosclerosis of native coronary artery of native heart without angina pectoris  Assessment & Plan:  On Eliquis, Ranexa, Toprol  Prn nitro  Followed by cardiology (Dr Du)      5. Longstanding persistent atrial fibrillation  Assessment & Plan:  Rate controlled  Currently on Metoprolol succinate 100mg in AM and 50mg in PM  Anticoagulated with Eliquis 5mg BID  Followed by cardiology      6. Primary hypertension  Assessment & Plan:  Does not monitor blood pressure regularly.  Well controlled in office today.  Continue Metoprolol succinate 100mg in AM and 50mg in PM.  Also on Diltiazem 120mg daily, valsartan 80mg nightly  Encouraged low sodium diet      7. Mixed hyperlipidemia  Assessment & Plan:  Repeat lipid panel pending  Currently on Atorvastatin 40mg nightly and Vascepa 2cap BID.  Stressed importance of dietary modifications. Follow a low cholesterol, low saturated fat diet with less that 200mg of cholesterol a day.  Avoid fried foods and high saturated  fats (high saturated fats less than 7% of calories).  Regular exercise can reduce LDL and raise HDL. Stressed importance of physical activity 5 times per week for 30 minutes per day.      8. MILE on CPAP  Assessment & Plan:  Wears CPAP and reports compliance nightly. Life time use expected.  Avoid excessive alcohol, smoking and overuse of sedatives at bedtime.  Weight management discussed. Goal BMI <30.  Adjust sleep position PRN.      9. Idiopathic chronic gout of right foot without tophus  Assessment & Plan:  No recent flares  Continue Allopurinal 100mg daily  Stay well hydrated by increasing water intake throughout the day.  Stressed importance of exercise and weight loss to maintain BMI <30.  Avoid alcohol, sodas, organ/glandular meats (liver, kidney, sweetbreads). Limit seafood and red meat intake.  Eat a balanced diet of fruits, vegetables, complex carbohydrates, and lean sources of protein (boneless/skinless chicken breasts, salmon, lentils, low fat dairy).      10. Insomnia, unspecified type  Assessment & Plan:  Sleep hygiene discussed  OK to try OTC melatonin or sleepytime teas      11. Tremors of nervous system  Assessment & Plan:  Stable  States really only an issue with writing.  Still able to play guitar.  Not followed by neurology      12. Vertigo  Assessment & Plan:  Intermittent  No recent episodes      13. Tobacco dependence  Assessment & Plan:  Patient reports use of snuff.  Not interested in quitting at this time.      14. Need for vaccination  Assessment & Plan:  Discussed recommended vaccinations.  Will obtain from local pharmacy.           A comprehensive HEALTH RISK ASSESSMENT was completed today. Results are summarized below:    There are NO EMOTIONAL/SOCIAL CONCERNS identified on today's screening for Social Isolation, Depression and Anxiety.    There are NO COGNITIVE FUNCTION CONCERNS identified on today's screening.  There are NO FUNCTIONAL OR SAFETY CONCERNS were identified on today's  screening for Physical Symptoms, Nutritional, Cognitive Function, Home Safety/Living Situation, Fall Risk, Activities of Daily Living, Independent Activities of Daily Living, Physical Activity, Timed Up and Go test and Whisper test.   The patient reports NO OPIOID PRESCRIPTIONS. This was confirmed through medication reconciliation and the [facility] website.    The patient is A CURRENT TOBACCO USER.  Tobacco Use: High Risk (11/7/2024)    Patient History     Smoking Tobacco Use: Former     Smokeless Tobacco Use: Current     Passive Exposure: Not on file     The patient reports NO SIGNIFICANT ALCOHOL USE.     All Questions regarding food, transportation or housing were not answered today.    Provided patient with a 5-10 year written screening schedule and personal prevention plan. Recommendations were developed using the USPSTF age appropriate recommendations. Education, counseling, and referrals were provided as needed. After Visit Summary printed and given to patient, which includes a list of additional screenings\tests needed.      Follow up in about 6 months (around 5/7/2025) for routine follow up. In addition to their scheduled follow up, the patient has also been instructed to follow up on as needed basis.       Future Appointments   Date Time Provider Department Center   3/21/2025 11:00 AM Linda Molina NP [facility] [address]   5/7/2025  1:40 PM Bucky Whittington MD [id number]  [place]

## 2024-11-07 NOTE — ASSESSMENT & PLAN NOTE
Does not monitor blood pressure regularly.  Well controlled in office today.  Continue Metoprolol succinate 100mg in AM and 50mg in PM.  Also on Diltiazem 120mg daily, valsartan 80mg nightly  Encouraged low sodium diet

## 2024-11-13 ENCOUNTER — LAB VISIT (OUTPATIENT)
Dept: LAB | Facility: HOSPITAL | Age: 74
End: 2024-11-13
Attending: INTERNAL MEDICINE
Payer: MEDICARE

## 2024-11-13 DIAGNOSIS — Z12.5 SCREENING FOR PROSTATE CANCER: ICD-10-CM

## 2024-11-13 DIAGNOSIS — Z00.00 WELLNESS EXAMINATION: ICD-10-CM

## 2024-11-13 DIAGNOSIS — E55.9 VITAMIN D DEFICIENCY: ICD-10-CM

## 2024-11-13 DIAGNOSIS — I10 PRIMARY HYPERTENSION: ICD-10-CM

## 2024-11-13 DIAGNOSIS — E78.2 MIXED HYPERLIPIDEMIA: ICD-10-CM

## 2024-11-13 LAB
25(OH)D3+25(OH)D2 SERPL-MCNC: 52 NG/ML (ref 30–80)
ALBUMIN SERPL-MCNC: 3.8 G/DL (ref 3.4–4.8)
ALBUMIN/GLOB SERPL: 1.6 RATIO (ref 1.1–2)
ALP SERPL-CCNC: 178 UNIT/L (ref 40–150)
ALT SERPL-CCNC: 47 UNIT/L (ref 0–55)
ANION GAP SERPL CALC-SCNC: 10 MEQ/L
AST SERPL-CCNC: 38 UNIT/L (ref 5–34)
BASOPHILS # BLD AUTO: 0.02 X10(3)/MCL
BASOPHILS NFR BLD AUTO: 0.3 %
BILIRUB SERPL-MCNC: 1.9 MG/DL
BUN SERPL-MCNC: 16.9 MG/DL (ref 8.4–25.7)
CALCIUM SERPL-MCNC: 9.1 MG/DL (ref 8.8–10)
CHLORIDE SERPL-SCNC: 107 MMOL/L (ref 98–107)
CHOLEST SERPL-MCNC: 129 MG/DL
CHOLEST/HDLC SERPL: 3 {RATIO} (ref 0–5)
CO2 SERPL-SCNC: 23 MMOL/L (ref 23–31)
CREAT SERPL-MCNC: 0.88 MG/DL (ref 0.72–1.25)
CREAT/UREA NIT SERPL: 19
EOSINOPHIL # BLD AUTO: 0.23 X10(3)/MCL (ref 0–0.9)
EOSINOPHIL NFR BLD AUTO: 3.9 %
ERYTHROCYTE [DISTWIDTH] IN BLOOD BY AUTOMATED COUNT: 12.6 % (ref 11.5–17)
GFR SERPLBLD CREATININE-BSD FMLA CKD-EPI: >60 ML/MIN/1.73/M2
GLOBULIN SER-MCNC: 2.4 GM/DL (ref 2.4–3.5)
GLUCOSE SERPL-MCNC: 99 MG/DL (ref 82–115)
HCT VFR BLD AUTO: 40.1 % (ref 42–52)
HDLC SERPL-MCNC: 50 MG/DL (ref 35–60)
HGB BLD-MCNC: 14 G/DL (ref 14–18)
IMM GRANULOCYTES # BLD AUTO: 0.02 X10(3)/MCL (ref 0–0.04)
IMM GRANULOCYTES NFR BLD AUTO: 0.3 %
LDLC SERPL CALC-MCNC: 55 MG/DL (ref 50–140)
LYMPHOCYTES # BLD AUTO: 1.34 X10(3)/MCL (ref 0.6–4.6)
LYMPHOCYTES NFR BLD AUTO: 22.7 %
MCH RBC QN AUTO: 37.1 PG (ref 27–31)
MCHC RBC AUTO-ENTMCNC: 34.9 G/DL (ref 33–36)
MCV RBC AUTO: 106.4 FL (ref 80–94)
MONOCYTES # BLD AUTO: 0.37 X10(3)/MCL (ref 0.1–1.3)
MONOCYTES NFR BLD AUTO: 6.3 %
NEUTROPHILS # BLD AUTO: 3.93 X10(3)/MCL (ref 2.1–9.2)
NEUTROPHILS NFR BLD AUTO: 66.5 %
NRBC BLD AUTO-RTO: 0 %
PLATELET # BLD AUTO: 126 X10(3)/MCL (ref 130–400)
PLATELETS.RETICULATED NFR BLD AUTO: 3.6 % (ref 0.9–11.2)
PMV BLD AUTO: 10.3 FL (ref 7.4–10.4)
POTASSIUM SERPL-SCNC: 3.9 MMOL/L (ref 3.5–5.1)
PROT SERPL-MCNC: 6.2 GM/DL (ref 5.8–7.6)
PSA SERPL-MCNC: 2.57 NG/ML
RBC # BLD AUTO: 3.77 X10(6)/MCL (ref 4.7–6.1)
SODIUM SERPL-SCNC: 140 MMOL/L (ref 136–145)
TRIGL SERPL-MCNC: 119 MG/DL (ref 34–140)
TSH SERPL-ACNC: 2.13 UIU/ML (ref 0.35–4.94)
VLDLC SERPL CALC-MCNC: 24 MG/DL
WBC # BLD AUTO: 5.91 X10(3)/MCL (ref 4.5–11.5)

## 2024-11-13 PROCEDURE — 84153 ASSAY OF PSA TOTAL: CPT

## 2024-11-13 PROCEDURE — 36415 COLL VENOUS BLD VENIPUNCTURE: CPT

## 2024-11-13 PROCEDURE — 80053 COMPREHEN METABOLIC PANEL: CPT

## 2024-11-13 PROCEDURE — 80061 LIPID PANEL: CPT

## 2024-11-13 PROCEDURE — 85025 COMPLETE CBC W/AUTO DIFF WBC: CPT

## 2024-11-13 PROCEDURE — 82306 VITAMIN D 25 HYDROXY: CPT

## 2024-11-13 PROCEDURE — 84443 ASSAY THYROID STIM HORMONE: CPT

## 2024-12-02 ENCOUNTER — PATIENT MESSAGE (OUTPATIENT)
Dept: INTERNAL MEDICINE | Facility: CLINIC | Age: 74
End: 2024-12-02
Payer: MEDICARE

## 2024-12-03 DIAGNOSIS — R74.01 TRANSAMINITIS: Primary | ICD-10-CM

## 2024-12-05 ENCOUNTER — TELEPHONE (OUTPATIENT)
Dept: INTERNAL MEDICINE | Facility: CLINIC | Age: 74
End: 2024-12-05
Payer: MEDICARE

## 2024-12-05 NOTE — TELEPHONE ENCOUNTER
----- Message from Rubin sent at 12/5/2024  2:17 PM CST -----  Who Called: Kevin Alva III    Caller is requesting information on test results.    Name of Test (Lab/Mammo/Etc):  lab  Date of Test:  11/13  Where the test was performed:    Ordering Provider:      Preferred Method of Contact: Phone Call  Patient's Preferred Phone Number on File: [phone]   Best Call Back Number, if different:  Additional Information:  pt would like go over results,

## 2024-12-13 ENCOUNTER — TELEPHONE (OUTPATIENT)
Dept: INTERNAL MEDICINE | Facility: CLINIC | Age: 74
End: 2024-12-13
Payer: MEDICARE

## 2024-12-13 NOTE — TELEPHONE ENCOUNTER
----- Message from Patricia sent at 12/13/2024  8:26 AM CST -----  .Who Called: Kevin Alva III    Caller is requesting information on test results.    Name of Test (Lab/Mammo/Etc): lab  Date of Test: unk  Where the test was performed: unk  Ordering Provider: Pk    Preferred Method of Contact: Phone Call  Patient's Preferred Phone Number on File: [phone]   Best Call Back Number, if different:  Additional Information: Iggy pt calling again about his results and asking before clinic

## 2025-01-29 ENCOUNTER — TELEPHONE (OUTPATIENT)
Dept: INTERNAL MEDICINE | Facility: CLINIC | Age: 75
End: 2025-01-29
Payer: MEDICARE

## 2025-01-29 ENCOUNTER — LAB VISIT (OUTPATIENT)
Dept: LAB | Facility: HOSPITAL | Age: 75
End: 2025-01-29
Attending: NURSE PRACTITIONER
Payer: MEDICARE

## 2025-01-29 DIAGNOSIS — R74.01 TRANSAMINITIS: ICD-10-CM

## 2025-01-29 DIAGNOSIS — R74.01 TRANSAMINITIS: Primary | ICD-10-CM

## 2025-01-29 LAB
ALBUMIN SERPL-MCNC: 3.9 G/DL (ref 3.4–4.8)
ALBUMIN/GLOB SERPL: 1.5 RATIO (ref 1.1–2)
ALP SERPL-CCNC: 126 UNIT/L (ref 40–150)
ALT SERPL-CCNC: 26 UNIT/L (ref 0–55)
ANION GAP SERPL CALC-SCNC: 9 MEQ/L
AST SERPL-CCNC: 26 UNIT/L (ref 5–34)
BASOPHILS # BLD AUTO: 0.03 X10(3)/MCL
BASOPHILS NFR BLD AUTO: 0.6 %
BILIRUB SERPL-MCNC: 1.2 MG/DL
BUN SERPL-MCNC: 15.8 MG/DL (ref 8.4–25.7)
CALCIUM SERPL-MCNC: 9.2 MG/DL (ref 8.8–10)
CHLORIDE SERPL-SCNC: 108 MMOL/L (ref 98–107)
CO2 SERPL-SCNC: 22 MMOL/L (ref 23–31)
CREAT SERPL-MCNC: 0.89 MG/DL (ref 0.72–1.25)
CREAT/UREA NIT SERPL: 18
EOSINOPHIL # BLD AUTO: 0.24 X10(3)/MCL (ref 0–0.9)
EOSINOPHIL NFR BLD AUTO: 5 %
ERYTHROCYTE [DISTWIDTH] IN BLOOD BY AUTOMATED COUNT: 12.4 % (ref 11.5–17)
GFR SERPLBLD CREATININE-BSD FMLA CKD-EPI: >60 ML/MIN/1.73/M2
GGT SERPL-CCNC: 147 U/L (ref 12–64)
GLOBULIN SER-MCNC: 2.6 GM/DL (ref 2.4–3.5)
GLUCOSE SERPL-MCNC: 107 MG/DL (ref 82–115)
HCT VFR BLD AUTO: 40 % (ref 42–52)
HGB BLD-MCNC: 14 G/DL (ref 14–18)
IMM GRANULOCYTES # BLD AUTO: 0.01 X10(3)/MCL (ref 0–0.04)
IMM GRANULOCYTES NFR BLD AUTO: 0.2 %
LYMPHOCYTES # BLD AUTO: 1.16 X10(3)/MCL (ref 0.6–4.6)
LYMPHOCYTES NFR BLD AUTO: 24.1 %
MCH RBC QN AUTO: 36.4 PG (ref 27–31)
MCHC RBC AUTO-ENTMCNC: 35 G/DL (ref 33–36)
MCV RBC AUTO: 103.9 FL (ref 80–94)
MONOCYTES # BLD AUTO: 0.37 X10(3)/MCL (ref 0.1–1.3)
MONOCYTES NFR BLD AUTO: 7.7 %
NEUTROPHILS # BLD AUTO: 3 X10(3)/MCL (ref 2.1–9.2)
NEUTROPHILS NFR BLD AUTO: 62.4 %
NRBC BLD AUTO-RTO: 0 %
PLATELET # BLD AUTO: 147 X10(3)/MCL (ref 130–400)
PMV BLD AUTO: 9.9 FL (ref 7.4–10.4)
POTASSIUM SERPL-SCNC: 4.1 MMOL/L (ref 3.5–5.1)
PROT SERPL-MCNC: 6.5 GM/DL (ref 5.8–7.6)
RBC # BLD AUTO: 3.85 X10(6)/MCL (ref 4.7–6.1)
SODIUM SERPL-SCNC: 139 MMOL/L (ref 136–145)
WBC # BLD AUTO: 4.81 X10(3)/MCL (ref 4.5–11.5)

## 2025-01-29 PROCEDURE — 80053 COMPREHEN METABOLIC PANEL: CPT

## 2025-01-29 PROCEDURE — 85025 COMPLETE CBC W/AUTO DIFF WBC: CPT

## 2025-01-29 PROCEDURE — 82977 ASSAY OF GGT: CPT

## 2025-01-29 PROCEDURE — 36415 COLL VENOUS BLD VENIPUNCTURE: CPT

## 2025-01-29 NOTE — TELEPHONE ENCOUNTER
Patient was seen in Nov '24 for AWV.  Wellness labs noted transaminitis.  It was recommended to hold atorvastatin and repeat Cmp in 1 month.    Repeat labs completed today.  Patient noted to have improvement and liver enzymes.  Did have elevated GGT.    Spoke with patient and updated on lab results.  Recommended abdominal ultrasound for further evaluation.  Also discussed changing cholesterol medication to Leqvio (he would like to discuss further with his cardiologist (Dr Du) prior to proceeding).      1. Transaminitis  - US Abdomen Complete; Future

## 2025-01-29 NOTE — TELEPHONE ENCOUNTER
----- Message from JEN Leon sent at 1/29/2025  3:30 PM CST -----  Spoke with patient and updated on lab results

## 2025-01-31 ENCOUNTER — TELEPHONE (OUTPATIENT)
Dept: INTERNAL MEDICINE | Facility: CLINIC | Age: 75
End: 2025-01-31
Payer: MEDICARE

## 2025-01-31 NOTE — TELEPHONE ENCOUNTER
----- Message from JEN Leon sent at 1/31/2025  9:39 AM CST -----  Please inform patient of results.    1.  Abdominal US reviewed.  No acute abdominal pathology seen.  He does have gallbladder sludge, but no findings to suggest cholecystitis (inflammation of the gallbladder).      No changes to current plan.  We previously spoke about starting Leqvio for cholesterol, but he wanted to discuss further with his cardiologist.

## 2025-02-03 ENCOUNTER — TELEPHONE (OUTPATIENT)
Dept: INTERNAL MEDICINE | Facility: CLINIC | Age: 75
End: 2025-02-03
Payer: MEDICARE

## 2025-02-03 NOTE — TELEPHONE ENCOUNTER
----- Message from [employer] sent at 2/3/2025  1:15 PM CST -----  .Type:  Patient Returning Call    Who Called:pt  Who Left Message for Patient:pt  Does the patient know what this is regarding?:ultrasound results  Would the patient rather a call back or a response via [facility]?   Best Call Back Number:[phone]  Additional Information: Please call back about ultrasound results

## 2025-02-03 NOTE — TELEPHONE ENCOUNTER
Called and spoke with patient. He was given results and recommendations. Verbalized understanding.

## 2025-02-03 NOTE — TELEPHONE ENCOUNTER
----- Message from [employer] sent at 2/3/2025  3:36 PM CST -----  .Type:  Patient Returning Call    Who Called:pt  Who Left Message for Patient:pt  Does the patient know what this is regarding?:ultrasound  Would the patient rather a call back or a response via [facility]? sloane  Best Call Back Number:[phone]  Additional Information: Please call back want () to call with a better understanding of ultrasound

## 2025-02-03 NOTE — TELEPHONE ENCOUNTER
Dr. Whittington did not Order the US     US Abdomen Complete: Result Notes     Luisa Lynnalber, FNP  1/31/2025  9:39 AM CST       Please inform patient of results.     1.  Abdominal US reviewed.  No acute abdominal pathology seen.  He does have gallbladder sludge, but no findings to suggest cholecystitis (inflammation of the gallbladder).       No changes to current plan.  We previously spoke about starting Leqvio for cholesterol, but he wanted to discuss further with his cardiologist.

## 2025-04-17 ENCOUNTER — PATIENT MESSAGE (OUTPATIENT)
Dept: INTERNAL MEDICINE | Facility: CLINIC | Age: 75
End: 2025-04-17
Payer: MEDICARE

## 2025-04-23 ENCOUNTER — OFFICE VISIT (OUTPATIENT)
Dept: INTERNAL MEDICINE | Facility: CLINIC | Age: 75
End: 2025-04-23
Payer: MEDICARE

## 2025-04-23 ENCOUNTER — PATIENT MESSAGE (OUTPATIENT)
Dept: INTERNAL MEDICINE | Facility: CLINIC | Age: 75
End: 2025-04-23

## 2025-04-23 VITALS
WEIGHT: 268.63 LBS | RESPIRATION RATE: 16 BRPM | DIASTOLIC BLOOD PRESSURE: 78 MMHG | HEIGHT: 69 IN | HEART RATE: 71 BPM | BODY MASS INDEX: 39.79 KG/M2 | SYSTOLIC BLOOD PRESSURE: 138 MMHG | OXYGEN SATURATION: 97 %

## 2025-04-23 DIAGNOSIS — R39.15 URINARY URGENCY: ICD-10-CM

## 2025-04-23 DIAGNOSIS — R53.83 MALAISE AND FATIGUE: Primary | ICD-10-CM

## 2025-04-23 DIAGNOSIS — R10.84 GENERALIZED ABDOMINAL PAIN: ICD-10-CM

## 2025-04-23 DIAGNOSIS — R53.81 MALAISE AND FATIGUE: Primary | ICD-10-CM

## 2025-04-23 DIAGNOSIS — R41.89 BRAIN FOG: ICD-10-CM

## 2025-04-23 LAB
25(OH)D3+25(OH)D2 SERPL-MCNC: 77 NG/ML (ref 30–80)
ALBUMIN SERPL-MCNC: 4.2 G/DL (ref 3.4–4.8)
ALBUMIN/GLOB SERPL: 1.3 RATIO (ref 1.1–2)
ALP SERPL-CCNC: 131 UNIT/L (ref 40–150)
ALT SERPL-CCNC: 22 UNIT/L (ref 0–55)
AMYLASE SERPL-CCNC: 64 UNIT/L (ref 20–160)
ANION GAP SERPL CALC-SCNC: 9 MEQ/L
AST SERPL-CCNC: 22 UNIT/L (ref 11–45)
BACTERIA #/AREA URNS AUTO: ABNORMAL /HPF
BASOPHILS # BLD AUTO: 0.02 X10(3)/MCL
BASOPHILS NFR BLD AUTO: 0.2 %
BILIRUB SERPL-MCNC: 2.2 MG/DL
BILIRUB UR QL STRIP.AUTO: NEGATIVE
BUN SERPL-MCNC: 12.2 MG/DL (ref 8.4–25.7)
CALCIUM SERPL-MCNC: 9.3 MG/DL (ref 8.8–10)
CHLORIDE SERPL-SCNC: 105 MMOL/L (ref 98–107)
CLARITY UR: CLEAR
CO2 SERPL-SCNC: 24 MMOL/L (ref 23–31)
COLOR UR AUTO: YELLOW
CREAT SERPL-MCNC: 0.76 MG/DL (ref 0.72–1.25)
CREAT/UREA NIT SERPL: 16
EOSINOPHIL # BLD AUTO: 0.11 X10(3)/MCL (ref 0–0.9)
EOSINOPHIL NFR BLD AUTO: 1 %
ERYTHROCYTE [DISTWIDTH] IN BLOOD BY AUTOMATED COUNT: 12.6 % (ref 11.5–17)
FLUAV AG UPPER RESP QL IA.RAPID: NOT DETECTED
FLUBV AG UPPER RESP QL IA.RAPID: NOT DETECTED
GFR SERPLBLD CREATININE-BSD FMLA CKD-EPI: >60 ML/MIN/1.73/M2
GLOBULIN SER-MCNC: 3.2 GM/DL (ref 2.4–3.5)
GLUCOSE SERPL-MCNC: 121 MG/DL (ref 82–115)
GLUCOSE UR QL STRIP: NORMAL
HCT VFR BLD AUTO: 44.4 % (ref 42–52)
HGB BLD-MCNC: 15.7 G/DL (ref 14–18)
HGB UR QL STRIP: NEGATIVE
IMM GRANULOCYTES # BLD AUTO: 0.04 X10(3)/MCL (ref 0–0.04)
IMM GRANULOCYTES NFR BLD AUTO: 0.4 %
KETONES UR QL STRIP: ABNORMAL
LEUKOCYTE ESTERASE UR QL STRIP: 25
LIPASE SERPL-CCNC: 25 U/L
LYMPHOCYTES # BLD AUTO: 0.99 X10(3)/MCL (ref 0.6–4.6)
LYMPHOCYTES NFR BLD AUTO: 8.9 %
MCH RBC QN AUTO: 37.1 PG (ref 27–31)
MCHC RBC AUTO-ENTMCNC: 35.4 G/DL (ref 33–36)
MCV RBC AUTO: 105 FL (ref 80–94)
MONOCYTES # BLD AUTO: 0.88 X10(3)/MCL (ref 0.1–1.3)
MONOCYTES NFR BLD AUTO: 7.9 %
MUCOUS THREADS URNS QL MICRO: ABNORMAL /LPF
NEUTROPHILS # BLD AUTO: 9.12 X10(3)/MCL (ref 2.1–9.2)
NEUTROPHILS NFR BLD AUTO: 81.6 %
NITRITE UR QL STRIP: NEGATIVE
NRBC BLD AUTO-RTO: 0 %
PH UR STRIP: 6.5 [PH]
PLATELET # BLD AUTO: 150 X10(3)/MCL (ref 130–400)
PMV BLD AUTO: 10.5 FL (ref 7.4–10.4)
POTASSIUM SERPL-SCNC: 4 MMOL/L (ref 3.5–5.1)
PROT SERPL-MCNC: 7.4 GM/DL (ref 5.8–7.6)
PROT UR QL STRIP: ABNORMAL
RBC # BLD AUTO: 4.23 X10(6)/MCL (ref 4.7–6.1)
RBC #/AREA URNS AUTO: ABNORMAL /HPF
RSV A 5' UTR RNA NPH QL NAA+PROBE: NOT DETECTED
SARS-COV-2 RNA RESP QL NAA+PROBE: NOT DETECTED
SODIUM SERPL-SCNC: 138 MMOL/L (ref 136–145)
SP GR UR STRIP.AUTO: 1.02 (ref 1–1.03)
SQUAMOUS #/AREA URNS LPF: ABNORMAL /HPF
TSH SERPL-ACNC: 1.84 UIU/ML (ref 0.35–4.94)
UROBILINOGEN UR STRIP-ACNC: NORMAL
WBC # BLD AUTO: 11.16 X10(3)/MCL (ref 4.5–11.5)
WBC #/AREA URNS AUTO: ABNORMAL /HPF

## 2025-04-23 PROCEDURE — 3078F DIAST BP <80 MM HG: CPT | Mod: CPTII,,, | Performed by: NURSE PRACTITIONER

## 2025-04-23 PROCEDURE — 99214 OFFICE O/P EST MOD 30 MIN: CPT | Mod: ,,, | Performed by: NURSE PRACTITIONER

## 2025-04-23 PROCEDURE — 80053 COMPREHEN METABOLIC PANEL: CPT | Performed by: NURSE PRACTITIONER

## 2025-04-23 PROCEDURE — 82150 ASSAY OF AMYLASE: CPT | Performed by: NURSE PRACTITIONER

## 2025-04-23 PROCEDURE — 3288F FALL RISK ASSESSMENT DOCD: CPT | Mod: CPTII,,, | Performed by: NURSE PRACTITIONER

## 2025-04-23 PROCEDURE — 4010F ACE/ARB THERAPY RXD/TAKEN: CPT | Mod: CPTII,,, | Performed by: NURSE PRACTITIONER

## 2025-04-23 PROCEDURE — 85025 COMPLETE CBC W/AUTO DIFF WBC: CPT | Performed by: NURSE PRACTITIONER

## 2025-04-23 PROCEDURE — 0241U COVID/RSV/FLU A&B PCR: CPT | Performed by: NURSE PRACTITIONER

## 2025-04-23 PROCEDURE — 83690 ASSAY OF LIPASE: CPT | Performed by: NURSE PRACTITIONER

## 2025-04-23 PROCEDURE — 1160F RVW MEDS BY RX/DR IN RCRD: CPT | Mod: CPTII,,, | Performed by: NURSE PRACTITIONER

## 2025-04-23 PROCEDURE — 81001 URINALYSIS AUTO W/SCOPE: CPT | Performed by: NURSE PRACTITIONER

## 2025-04-23 PROCEDURE — 82306 VITAMIN D 25 HYDROXY: CPT | Performed by: NURSE PRACTITIONER

## 2025-04-23 PROCEDURE — 1101F PT FALLS ASSESS-DOCD LE1/YR: CPT | Mod: CPTII,,, | Performed by: NURSE PRACTITIONER

## 2025-04-23 PROCEDURE — 84443 ASSAY THYROID STIM HORMONE: CPT | Performed by: NURSE PRACTITIONER

## 2025-04-23 PROCEDURE — 3075F SYST BP GE 130 - 139MM HG: CPT | Mod: CPTII,,, | Performed by: NURSE PRACTITIONER

## 2025-04-23 PROCEDURE — 3008F BODY MASS INDEX DOCD: CPT | Mod: CPTII,,, | Performed by: NURSE PRACTITIONER

## 2025-04-23 PROCEDURE — 1159F MED LIST DOCD IN RCRD: CPT | Mod: CPTII,,, | Performed by: NURSE PRACTITIONER

## 2025-04-23 PROCEDURE — 1125F AMNT PAIN NOTED PAIN PRSNT: CPT | Mod: CPTII,,, | Performed by: NURSE PRACTITIONER

## 2025-04-23 NOTE — PROGRESS NOTES
Internal Medicine      Patient Name:  Kevin Alva III  Patient ID:  [id number]    Chief Complaint:  Abdominal Pain and Fatigue      Kevin Alva III is a 74 y.o. male, known to Dr Whittington, is here today for requested visit.  Medical comorbidities include HTN, HLD, CAD, AFib, obesity, MILE on CPAP, insomnia, vertigo.    History of Present Illness    CHIEF COMPLAINT:  Mr. Alva presents today for fatigue and abdominal discomfort.    HISTORY OF PRESENT ILLNESS:  He reports severe fatigue beginning Monday that has been constant for three days, noting it differs from his previous intermittent fatigue. He denies any alleviating factors. Concurrent abdominal discomfort began Monday, described as mild rather than severe pain. The discomfort location varies, initially in the upper right quadrant, then moving across the abdomen, and experienced in the lower abdomen with straining. He reports intermittent mild nausea with decreased appetite, making it difficult to distinguish between hunger and nausea. He has only consumed protein drinks since Monday. He denies vomiting. He describes his head feeling heavy with brain fog but denies sinus congestion, respiratory symptoms, or deangelo headache.    GENITOURINARY:  He reports strong urinary urgency with minimal output for the past couple weeks. He denies burning with urination or hematuria.    MEDICAL HISTORY:  His history is significant for appendectomy and benign tumor. He experiences vertigo when getting in bed and rolling over.    FAMILY HISTORY:  His sister  of pancreatic cancer approximately 23 years ago.    CURRENT MEDICATIONS:  He takes Valsartan, Ranexa, Vascepa, Metoprolol, Diltiazem, Eliquis, and Allopurinol regularly. He takes Lasix as needed, using it only once or twice in the past five years.        Last AWV:  2024         Past Medical History:   Diagnosis Date    Hyperlipidemia     Hypertension     MILE on CPAP         Past Surgical History:   Procedure  Laterality Date    APPENDECTOMY  1955    CORONARY ANGIOPLASTY WITH STENT PLACEMENT  03/2022    CORONARY ARTERY BYPASS GRAFT  2000    LEFT HEART CATHETERIZATION Left 10/19/2023    Procedure: Left heart cath;  Surgeon: Krystin Du MD;  Location: [facility] CATH LAB;  Service: Cardiology;  Laterality: Left;  LHC VIA RT FEMORAL    REPLACEMENT OF DUAL CHAMBER PACEMAKER GENERATOR N/A 7/8/2024    Procedure: REPLACEMENT, PULSE GENERATOR OF DUAL CHAMBER PACEMAKER;  Surgeon: Uri Chino MD;  Location: [facility] CATH LAB;  Service: Cardiology;  Laterality: N/A;  DUAL CHAMBER PPM GEN CHANGE (MDT) *TEMP PACING*    TONSILLECTOMY  1956        Social History     Tobacco Use    Smoking status: Former    Smokeless tobacco: Current     Types: Chew   Substance and Sexual Activity    Alcohol use: Yes     Alcohol/week: 1.0 standard drink of alcohol     Types: 1 Drinks containing 0.5 oz of alcohol per week     Comment: Every now and then    Drug use: Never    Sexual activity: Not Currently     Partners: Female     Birth control/protection: Abstinence        Current Outpatient Medications   Medication Instructions    allopurinoL (ZYLOPRIM) 100 MG tablet TAKE 1 TABLET BY MOUTH EVERY DAY    apixaban (ELIQUIS) 5 mg, 2 times daily    diltiaZEM (CARDIZEM CD) 120 mg, Daily    furosemide (LASIX) 40 mg, Daily PRN    metoprolol succinate (TOPROL-XL) 100 mg, Every morning    nitroGLYCERIN (NITROSTAT) 0.4 MG SL tablet DISSOLVE 1 TABLET UNDER THE TONGUE EVERY 5 MINUTES AS NEEDED FOR CHEST PAIN    ranolazine (RANEXA) 1,000 mg, 2 times daily    valsartan (DIOVAN) 80 mg, Nightly    VASCEPA 1 gram Cap 2 capsules, 2 times daily       Review of patient's allergies indicates:   Allergen Reactions    Promethazine Hives        Patient Care Team:  Bucky Whittington MD as PCP - General (Internal Medicine)  Linda Molina NP as Nurse Practitioner (Neurology)  Rolly Reid MD as Consulting Physician (Colon and Rectal Surgery)       Subjective:    Review of  "Systems    12 point review of systems conducted, negative except as stated in the history of present illness. See HPI for details.      Objective:    Visit Vitals  /78 (BP Location: Right arm, Patient Position: Sitting)   Pulse 71   Resp 16   Ht 5' 9" (1.753 m)   Wt 121.8 kg (268 lb 9.6 oz)   SpO2 97%   BMI 39.67 kg/m²       Physical Exam  Vitals and nursing note reviewed.   Constitutional:       General: He is not in acute distress.     Appearance: He is not ill-appearing.   HENT:      Head: Normocephalic and atraumatic.      Mouth/Throat:      Mouth: Mucous membranes are moist.      Pharynx: Oropharynx is clear.   Eyes:      General: No scleral icterus.     Extraocular Movements: Extraocular movements intact.      Conjunctiva/sclera: Conjunctivae normal.      Pupils: Pupils are equal, round, and reactive to light.   Neck:      Vascular: No carotid bruit.   Cardiovascular:      Rate and Rhythm: Normal rate and regular rhythm.      Heart sounds: Normal heart sounds. No murmur heard.     No friction rub. No gallop.   Pulmonary:      Effort: Pulmonary effort is normal. No respiratory distress.      Breath sounds: Normal breath sounds. No wheezing, rhonchi or rales.   Abdominal:      General: Bowel sounds are normal. There is no distension.      Palpations: Abdomen is soft. There is no mass.      Tenderness: There is abdominal tenderness (Right-sided abdominal tenderness to palpation). There is no right CVA tenderness, left CVA tenderness, guarding or rebound.       Musculoskeletal:         General: Normal range of motion.      Cervical back: Normal range of motion and neck supple.   Lymphadenopathy:      Cervical: No cervical adenopathy.   Skin:     General: Skin is warm and dry.      Capillary Refill: Capillary refill takes less than 2 seconds.   Neurological:      General: No focal deficit present.      Mental Status: He is alert and oriented to person, place, and time.   Psychiatric:         Mood and Affect: " Mood normal.         Behavior: Behavior normal.       Labs Reviewed:    Chemistry:  Lab Results   Component Value Date     01/29/2025    K 4.1 01/29/2025    BUN 15.8 01/29/2025    CREATININE 0.89 01/29/2025    EGFRNORACEVR >60 01/29/2025    GLUCOSE 107 01/29/2025    CALCIUM 9.2 01/29/2025    ALKPHOS 126 01/29/2025    LABPROT 6.5 01/29/2025    ALBUMIN 3.9 01/29/2025    BILIDIR 0.5 03/18/2022    IBILI 0.70 03/18/2022    AST 26 01/29/2025    ALT 26 01/29/2025    EGJEJYFD74IU 52 11/13/2024    TSH 2.130 11/13/2024    PSA 2.57 11/13/2024        Lab Results   Component Value Date    HGBA1C 5.0 10/27/2022        Hematology:  Lab Results   Component Value Date    WBC 4.81 01/29/2025    HGB 14.0 01/29/2025    HCT 40.0 (L) 01/29/2025     01/29/2025       Lipid Panel:  Lab Results   Component Value Date    CHOL 129 11/13/2024    HDL 50 11/13/2024    LDL 55.00 11/13/2024    TRIG 119 11/13/2024    TOTALCHOLEST 3 11/13/2024          Assessment:      ICD-10-CM ICD-9-CM   1. Malaise and fatigue  R53.81 780.79    R53.83    2. Brain fog  R41.89 799.59   3. Generalized abdominal pain  R10.84 789.07   4. Urinary urgency  R39.15 788.63        Plan:    1. Malaise and fatigue  Assessment & Plan:  Will get updated labs to further evaluate    Orders:  -     Comprehensive Metabolic Panel  -     Cancel: Comprehensive Metabolic Panel  -     Amylase  -     Lipase  -     TSH  -     Vitamin D  -     Urinalysis, Reflex to Urine Culture  -     COVID/RSV/FLU A&B PCR; Future; Expected date: 04/23/2025  -     CBC Auto Differential; Future; Expected date: 04/23/2025    2. Brain fog  Assessment & Plan:  Will get updated labs to further evaluate    Orders:  -     COVID/RSV/FLU A&B PCR; Future; Expected date: 04/23/2025  -     CBC Auto Differential; Future; Expected date: 04/23/2025    3. Generalized abdominal pain  Assessment & Plan:  Will get updated labs to further evaluate  Consider repeat abdominal US if symptoms persist    Orders:  -      Comprehensive Metabolic Panel  -     Amylase  -     Lipase  -     CBC Auto Differential; Future; Expected date: 04/23/2025    4. Urinary urgency  Assessment & Plan:  Will get UA to r/o UTI.    Orders:  -     Urinalysis, Reflex to Urine Culture         Follow up for Previously scheduled and PRN if need. In addition to their scheduled follow up, the patient has also been instructed to follow up on as needed basis.       Future Appointments   Date Time Provider Department Center   5/7/2025  1:40 PM Bucky Whittington MD [facility]          JEN Butler      Disclaimer:  This note was generated with the assistance of ambient listening technology. Verbal consent was obtained by the patient and accompanying visitor(s) for the recording of patient appointment to facilitate this note. I attest to having reviewed and edited the generated note for accuracy, though some syntax or spelling errors may persist. Please contact the author of this note for any clarification.

## 2025-04-24 ENCOUNTER — TELEPHONE (OUTPATIENT)
Dept: INTERNAL MEDICINE | Facility: CLINIC | Age: 75
End: 2025-04-24
Payer: MEDICARE

## 2025-04-24 ENCOUNTER — RESULTS FOLLOW-UP (OUTPATIENT)
Dept: INTERNAL MEDICINE | Facility: CLINIC | Age: 75
End: 2025-04-24
Payer: MEDICARE

## 2025-04-24 DIAGNOSIS — R53.81 MALAISE AND FATIGUE: Primary | ICD-10-CM

## 2025-04-24 DIAGNOSIS — R53.83 MALAISE AND FATIGUE: Primary | ICD-10-CM

## 2025-04-24 DIAGNOSIS — R80.9 PROTEINURIA, UNSPECIFIED TYPE: ICD-10-CM

## 2025-04-24 NOTE — TELEPHONE ENCOUNTER
Copied from CRM #[id number]. Topic: General Inquiry - Return Call  >> Apr 24, 2025 12:40 PM Pia wrote:  Who Called: Kevin Alva III    Patient is returning phone call    Who Left Message for Patient:panchito bensonalber  Does the patient know what this is regarding?:results f/u      Preferred Method of Contact: Phone Call  Patient's Preferred Phone Number on File: [phone]   Best Call Back Number, if different:  Additional Information: return call, please advise, thanks

## 2025-04-24 NOTE — TELEPHONE ENCOUNTER
Spoke with patient and updated on labs.    -WBC normal but elevated compared to recent labs.  -1+ protein, 1+ ketones in urine.  -amylase and lipase normal  -vitamin-D normal  -TSH normal  -COVID/flu/RSV negative      Patient states he is feeling significantly better today, but still not back to baseline.  Discussed obtaining repeat UA well hydrated along with testosterone level prior to next appointment.      1. Malaise and fatigue  - Testosterone; Future    2. Proteinuria, unspecified type  - Urinalysis, Reflex to Urine Culture; Future

## 2025-04-30 ENCOUNTER — TELEPHONE (OUTPATIENT)
Dept: INTERNAL MEDICINE | Facility: CLINIC | Age: 75
End: 2025-04-30
Payer: MEDICARE

## 2025-04-30 NOTE — TELEPHONE ENCOUNTER
----- Message from Bita sent at 4/30/2025  3:26 PM CDT -----  .Who Called: Kevin MEHTA Artie IIIPatient is returning phone callWho Left Message for Patient:Mera the patient know what this is regarding?:PT stated he's returning missed call about his wellness Preferred Method of Contact: Phone CallPatient's Preferred Phone Number on File: [phone] Best Call Back Number, if different:Additional Information: PT stated he's returning missed call about his wellness

## 2025-05-01 ENCOUNTER — TELEPHONE (OUTPATIENT)
Dept: INTERNAL MEDICINE | Facility: CLINIC | Age: 75
End: 2025-05-01
Payer: MEDICARE

## 2025-05-01 NOTE — TELEPHONE ENCOUNTER
----- Message from Margaret sent at 5/1/2025  9:05 AM CDT -----  I called Mr. Alva on this morning. I thought I spoke to him on yesterday. He canceled his jonathan. I did leave him a message to call me back to reschedule when he is available.

## 2025-05-05 ENCOUNTER — RESULTS FOLLOW-UP (OUTPATIENT)
Dept: INTERNAL MEDICINE | Facility: CLINIC | Age: 75
End: 2025-05-05
Payer: MEDICARE

## 2025-05-05 ENCOUNTER — LAB VISIT (OUTPATIENT)
Dept: LAB | Facility: HOSPITAL | Age: 75
End: 2025-05-05
Attending: FAMILY MEDICINE
Payer: MEDICARE

## 2025-05-05 DIAGNOSIS — R80.9 PROTEINURIA, UNSPECIFIED TYPE: ICD-10-CM

## 2025-05-05 DIAGNOSIS — R53.81 MALAISE AND FATIGUE: ICD-10-CM

## 2025-05-05 DIAGNOSIS — R53.83 MALAISE AND FATIGUE: ICD-10-CM

## 2025-05-05 LAB
BACTERIA #/AREA URNS AUTO: ABNORMAL /HPF
BILIRUB UR QL STRIP.AUTO: NEGATIVE
CLARITY UR: CLEAR
COLOR UR AUTO: YELLOW
GLUCOSE UR QL STRIP: NORMAL
HGB UR QL STRIP: NEGATIVE
KETONES UR QL STRIP: NEGATIVE
LEUKOCYTE ESTERASE UR QL STRIP: NEGATIVE
MUCOUS THREADS URNS QL MICRO: ABNORMAL /LPF
NITRITE UR QL STRIP: NEGATIVE
PH UR STRIP: 6.5 [PH]
PROT UR QL STRIP: NEGATIVE
RBC #/AREA URNS AUTO: ABNORMAL /HPF
SP GR UR STRIP.AUTO: 1.01 (ref 1–1.03)
SQUAMOUS #/AREA URNS LPF: ABNORMAL /HPF
TESTOST SERPL-MCNC: 150.57 NG/DL (ref 220.91–715.81)
UROBILINOGEN UR STRIP-ACNC: NORMAL
WBC #/AREA URNS AUTO: ABNORMAL /HPF

## 2025-05-05 PROCEDURE — 81001 URINALYSIS AUTO W/SCOPE: CPT

## 2025-05-05 PROCEDURE — 84403 ASSAY OF TOTAL TESTOSTERONE: CPT

## 2025-05-05 PROCEDURE — 36415 COLL VENOUS BLD VENIPUNCTURE: CPT

## 2025-05-06 DIAGNOSIS — R79.89 LOW TESTOSTERONE IN MALE: Primary | ICD-10-CM

## 2025-05-06 RX ORDER — TESTOSTERONE CYPIONATE 200 MG/ML
200 INJECTION, SOLUTION INTRAMUSCULAR
COMMUNITY
End: 2025-05-06 | Stop reason: SDUPTHER

## 2025-05-06 NOTE — TELEPHONE ENCOUNTER
Result Note  Please inform patient of results.      UA with normal findings.  Testosterone is low.       He was scheduled for follow up visit later this week.  Can you assist him with rescheduling to discuss testosterone results?     Testosterone; Urinalysis, Reflex to Urine Culture

## 2025-05-06 NOTE — TELEPHONE ENCOUNTER
Patient is returning the phone call for results.    Wanted to verify that Appt is needed to advise on Testosterone Results.  I do not see that he is currently prescribed Testosterone Medication

## 2025-05-06 NOTE — TELEPHONE ENCOUNTER
"Patient is okay with doing Testosterone Replacement Therapy.  Meds Proposed    He then Stated:  "I am highly upset that I was supposed to see Dr. Whittington and my appt was canceled, I went to the lab to get blood work drawn and they only had a Urine and Testosterone Level".    I tried to explain to the patient the reason why Dr. Whittington cancelled the Appt/No labs needed  (Because he just had a full panel done on 04/23/25 and he would get billed if redrawn, also he was seen on 04/23/25 by Luisa, Dr. WHITTINGTON wanted to advise via phone message, No Appt Necessary)     He stated "Let me finish with my concerns".    After patient continued to go on as to the reason he was upset about his appt being cancelled.  I made him aware that the medication was proposed to Dr. Whittington and will let Dr. Whittington know of his concerns     He then stated "So to hell with my concerns then".    Again I let patient know the message will be sent to Dr. Whittington since he was addiment about voicing his concerned but did not care about understanding the WHY of the situation.  Therefore Message being sent to Dr. Whittington to make him aware.   "

## 2025-05-06 NOTE — TELEPHONE ENCOUNTER
Copied from CRM #[id number]. Topic: General Inquiry - Patient Advice  >> May 6, 2025  2:52 PM Zulema Stephen wrote:  Who Called: Kevin Alva III    Caller is requesting assistance/information from provider's office.    Symptoms (please be specific):    How long has patient had these symptoms:    List of preferred pharmacies on file (remove unneeded): [unfilled]  If different, enter pharmacy into here including location and phone number:       Preferred Method of Contact: Phone Call  Patient's Preferred Phone Number on File: [phone]   Best Call Back Number, if different:  Additional Information: pt missed call. Please advise

## 2025-05-06 NOTE — TELEPHONE ENCOUNTER
Testosterone Level is elevated, he is not on any Testosterone Therapy Medication.   Recommendations?

## 2025-05-06 NOTE — TELEPHONE ENCOUNTER
Dr. Whittington cancelled his OV because he said it was not needed to discuss results.  I will get with him to determine how he wants to handle the results via phone.

## 2025-05-07 RX ORDER — TESTOSTERONE CYPIONATE 200 MG/ML
200 INJECTION, SOLUTION INTRAMUSCULAR
Qty: 10 ML | Refills: 1 | Status: SHIPPED | OUTPATIENT
Start: 2025-05-07

## 2025-05-07 NOTE — TELEPHONE ENCOUNTER
Depo-Testosterone 200 milligrams/mL 1 cc every 2 weeks dispensed 10 mL repeat testosterone level in 4 months with a CBC

## 2025-05-15 ENCOUNTER — TELEPHONE (OUTPATIENT)
Dept: INTERNAL MEDICINE | Facility: CLINIC | Age: 75
End: 2025-05-15
Payer: MEDICARE

## 2025-05-15 NOTE — TELEPHONE ENCOUNTER
Copied from CRM #[id number]. Topic: General Inquiry - Patient Advice  >> May 15, 2025  2:13 PM Mg wrote:  .Who Called: Kevin Alva III    Caller is requesting assistance/information from provider's office.    Symptoms (please be specific): N/A   How long has patient had these symptoms: N/A  List of preferred pharmacies on file (remove unneeded): [unfilled]  If different, enter pharmacy into here including location and phone number: N/A    Preferred Method of Contact: Phone Call    Patient's Preferred Phone Number on File: [phone]     Best Call Back Number, if different:    Additional Information: Pt called stated he just picked up rx for  testosterone cypionate (DEPOTESTOTERONE CYPIONATE) 200 mg/mL injection and instructions state for him to Inject 1 mL (200 mg total) into the muscle every 14 (fourteen) days. - Intramuscular. He is wanting to know inject in what muscle. Please advise, thank you.

## 2025-05-15 NOTE — TELEPHONE ENCOUNTER
Spoke to patient, advised of areas he can inject the medication. Offered patient to come by the office and we can show him how to inject, he will call if he needs. Verbalized understanding he can inject in quad femoris of the thigh

## 2025-05-26 ENCOUNTER — OFFICE VISIT (OUTPATIENT)
Dept: NEUROLOGY | Facility: CLINIC | Age: 75
End: 2025-05-26
Payer: MEDICARE

## 2025-05-26 DIAGNOSIS — G47.33 OSA ON CPAP: Primary | Chronic | ICD-10-CM

## 2025-05-26 PROCEDURE — 1159F MED LIST DOCD IN RCRD: CPT | Mod: CPTII,95,, | Performed by: NURSE PRACTITIONER

## 2025-05-26 PROCEDURE — 1160F RVW MEDS BY RX/DR IN RCRD: CPT | Mod: CPTII,95,, | Performed by: NURSE PRACTITIONER

## 2025-05-26 PROCEDURE — 98005 SYNCH AUDIO-VIDEO EST LOW 20: CPT | Mod: 95,,, | Performed by: NURSE PRACTITIONER

## 2025-05-26 PROCEDURE — 4010F ACE/ARB THERAPY RXD/TAKEN: CPT | Mod: CPTII,95,, | Performed by: NURSE PRACTITIONER

## 2025-05-26 NOTE — PROGRESS NOTES
Neurology Telemedicine Note  Patient treated using real-time Audio/Video, according to [facility] protocols  The patient (or their representative) stated that they understood & accepted the privacy/security risks to their info at their location.  Patient participated in the visit at a non-[facility] location selected by themself, or their representative.  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    ROS:  A 14pt ROS was reviewed & is negative unless otherwise documented in the HPI    OBJECTIVE:  GENERAL: NAD, calm, cooperative, appropriate  RESP: CTAB  HEART: RRR  no LE edema  MENTAL STATUS: Oriented x4, follows commands reliably  SPEECH/LANGUAGE: Clear, coherent  gaze conjugate  No tactile or motor facial asymmetry  t/p midline  Motor: No focal weakness  Cerebellar: No tremor or dysmetria    f2f time spent w/ pt exceeds 21 min, over 50% of which was used for education & counseling regarding medical conditions, current medications including risk/benefit & side effect/adverse events, otc meds - uses/doses, home self-care & contact precautions; red flags & indications for immediate medical attention. the patient is receptive, expresses understanding and is agreeable to the plan. All questions answered.     SLEEP TESTING:  Download today  Ahi 2.7    Assessment   1. MILE on CPAP      Plan   Cont cpap to 18  Supplies renewed  Compliant/benefitting/medically necessary  DME: [facility]  F/u 1y    Linda Molina, [facility]

## 2025-05-26 NOTE — PATIENT INSTRUCTIONS
"   Sleep Apnea in Adults   The Basics   Written by the doctors and editors at [facility]   What is sleep apnea? -- Sleep apnea is a condition that makes you stop breathing for short periods while you are asleep. There are 2 types of sleep apnea. One is called "obstructive sleep apnea," and the other is called "central sleep apnea."  In obstructive sleep apnea, you stop breathing because your throat narrows or closes (figure 1). In central sleep apnea, you stop breathing because your brain does not send the right signals to your muscles to make you breathe. When people talk about sleep apnea, they are usually referring to obstructive sleep apnea, which is what this article is about.  People with sleep apnea do not know that they stop breathing when they are asleep. But they do sometimes wake up startled or gasping for breath. They also often hear from loved ones that they snore.  What are the symptoms of sleep apnea? -- The main symptoms of sleep apnea are loud snoring, tiredness, and daytime sleepiness. Other symptoms can include:  Restless sleep  Waking up choking or gasping  Morning headaches, dry mouth, or sore throat  Waking up often to urinate  Waking up feeling unrested or groggy  Trouble thinking clearly or remembering things  Some people with sleep apnea don't have symptoms, or they don't know they have them. They might figure that it's normal to be tired or to snore a lot.  Should I see a doctor or nurse? -- Yes. If you think you might have sleep apnea, see your doctor.  Is there a test for sleep apnea? -- Yes. If your doctor or nurse suspects you have sleep apnea, they might send you for a "sleep study." Sleep studies can sometimes be done at home, but they are usually done in a sleep lab. For the study, you spend the night in the lab, and you are hooked up to different machines that monitor your heart rate, breathing, and other body functions. The results of the test will tell your doctor or nurse if you " "have the disorder.  Is there anything I can do on my own to help my sleep apnea? -- Yes. Here are some things that might help:  Stay off your back when sleeping. (This is not always practical, because people cannot control their position while asleep. Plus, it only helps some people.)  Lose weight, if you are overweight  Avoid alcohol, because it can make sleep apnea worse  How is sleep apnea treated? -- As mentioned above, weight loss can help if you are overweight or obese. But losing weight can be challenging, and it takes time to lose enough weight to help with your sleep apnea. Most people need other treatment while they work on losing weight.  The most effective treatment for sleep apnea is a device that keeps your airway open while you sleep. Treatment with this device is called "continuous positive airway pressure," or CPAP. People getting CPAP wear a face mask at night that keeps them breathing (figure 2).  If your doctor or nurse recommends a CPAP machine, try to be patient about using it. The mask might seem uncomfortable to wear at first, and the machine might seem noisy, but using the machine can really pay off. People with sleep apnea who use a CPAP machine feel more rested and generally feel better.  There is also another device that you wear in your mouth called an "oral appliance" or "mandibular advancement device." It also helps keep your airway open while you sleep. But devices do not work as well as CPAP for treating sleep apnea.  In rare cases, when nothing else helps, doctors recommend surgery to keep the airway open. Surgery to do this is not always effective, and even when it is, the problem can come back.  Is sleep apnea dangerous? -- It can be. People with sleep apnea do not get good-quality sleep, so they are often tired and not alert. This puts them at risk for car accidents and other types of accidents. Plus, studies show that people with sleep apnea are more likely than others to have " high blood pressure, heart attacks, and other serious heart problems. In people with severe sleep apnea, getting treated (for example, with a CPAP machine) can help prevent some of these problems.  All topics are updated as new evidence becomes available and our peer review process is complete.  This topic retrieved from [employer] on: Sep 21, 2021.  Topic 54302 Version 12.0  Release: 29.4.2 - C29.263  © 2021 UpToDate, Inc. and/or its affiliates. All rights reserved.  figure 1: Airway in a person with sleep apnea     Normally when a person sleeps, the airway remains open, and air can pass from the nose and mouth to the lungs. In a person with sleep apnea, parts of the throat and mouth drop into the airway and block off the flow of air. This can cause loud snoring and interrupt breathing for short periods.  Graphic 06807 Version 5.0    figure 2: Continuous positive airway pressure (CPAP) for sleep apnea     The CPAP mask gently blows air into your nose while you sleep. It puts just enough pressure on your airway to keep it from closing. The mask in this picture fits over just the nose. Other CPAP devices have masks that fit over the nose and mouth.  Graphic 17499 Version 5.0    Consumer Information Use and Disclaimer   This information is not specific medical advice and does not replace information you receive from your health care provider. This is only a brief summary of general information. It does NOT include all information about conditions, illnesses, injuries, tests, procedures, treatments, therapies, discharge instructions or life-style choices that may apply to you. You must talk with your health care provider for complete information about your health and treatment options. This information should not be used to decide whether or not to accept your health care provider's advice, instructions or recommendations. Only your health care provider has the knowledge and training to provide advice that is right for  you. The use of this information is governed by the [employer] End User License Agreement, available at [email or web address].The use of [employer] content is governed by the [employer] Terms of Use. ©2021 UpToDate, Inc. All rights reserved.  Copyright   © 2021 UpToDate, Inc. and/or its affiliates. All rights reserved.

## 2025-08-12 ENCOUNTER — PATIENT OUTREACH (OUTPATIENT)
Dept: ADMINISTRATIVE | Facility: HOSPITAL | Age: 75
End: 2025-08-12
Payer: MEDICARE

## (undated) DEVICE — SUT CTD VICRYL PLUS 4/0

## (undated) DEVICE — ILLUMINATOR PHOTONBLADE 8/11IN

## (undated) DEVICE — CONTRAST ISOVUE 370 500ML MULT

## (undated) DEVICE — PAD DEFIB CADENCE ADULT R2

## (undated) DEVICE — DRAPE INCISE IOBAN 2 23X23IN

## (undated) DEVICE — GLOVE PROTEXIS BLUE LATEX 7.5

## (undated) DEVICE — Device

## (undated) DEVICE — SUT VICRYL 2-0 36 CT-1

## (undated) DEVICE — PACK OR CLEAN UP COMBO SIZE 2

## (undated) DEVICE — SUT SILK 0 SH 30IN BLK BR

## (undated) DEVICE — CATH IMPULSE IM 5FR 125CM

## (undated) DEVICE — SHEATH INTRODUCER 6FR 11CM

## (undated) DEVICE — KIT MANIFOLD LOW PRESS TUBING

## (undated) DEVICE — ADHESIVE DERMABOND ADVANCED

## (undated) DEVICE — COVER PROBE US 5.5X58L NON LTX

## (undated) DEVICE — KIT HAND CONTROL HIGH PRESSUR

## (undated) DEVICE — KIT MINI STK MAX COAX 5FR 10CM

## (undated) DEVICE — GUIDEWIRE INQWIRE SE 3MM JTIP

## (undated) DEVICE — CATH ELECTRODE BLLN 5FR 110CM

## (undated) DEVICE — CANNULA DUAL CO2/O2 NASAL 7FT

## (undated) DEVICE — GLOVE 7.5 PROTEXIS PI MICRO